# Patient Record
Sex: MALE | Race: WHITE | NOT HISPANIC OR LATINO | Employment: OTHER | URBAN - METROPOLITAN AREA
[De-identification: names, ages, dates, MRNs, and addresses within clinical notes are randomized per-mention and may not be internally consistent; named-entity substitution may affect disease eponyms.]

---

## 2017-04-13 ENCOUNTER — ALLSCRIPTS OFFICE VISIT (OUTPATIENT)
Dept: OTHER | Facility: OTHER | Age: 77
End: 2017-04-13

## 2017-06-26 ENCOUNTER — ALLSCRIPTS OFFICE VISIT (OUTPATIENT)
Dept: OTHER | Facility: OTHER | Age: 77
End: 2017-06-26

## 2017-06-26 ENCOUNTER — TRANSCRIBE ORDERS (OUTPATIENT)
Dept: ADMINISTRATIVE | Facility: HOSPITAL | Age: 77
End: 2017-06-26

## 2017-06-26 DIAGNOSIS — G56.03 CARPAL TUNNEL SYNDROME, BILATERAL: Primary | ICD-10-CM

## 2017-07-05 ENCOUNTER — OFFICE VISIT (OUTPATIENT)
Dept: RADIOLOGY | Facility: CLINIC | Age: 77
End: 2017-07-05
Payer: COMMERCIAL

## 2017-07-05 DIAGNOSIS — G56.03 CARPAL TUNNEL SYNDROME, BILATERAL: ICD-10-CM

## 2017-07-05 PROCEDURE — 95910 NRV CNDJ TEST 7-8 STUDIES: CPT

## 2017-07-05 PROCEDURE — 95886 MUSC TEST DONE W/N TEST COMP: CPT

## 2017-07-17 ENCOUNTER — ALLSCRIPTS OFFICE VISIT (OUTPATIENT)
Dept: OTHER | Facility: OTHER | Age: 77
End: 2017-07-17

## 2017-07-17 ENCOUNTER — APPOINTMENT (OUTPATIENT)
Dept: LAB | Facility: CLINIC | Age: 77
End: 2017-07-17
Payer: COMMERCIAL

## 2017-07-17 DIAGNOSIS — G56.03 CARPAL TUNNEL SYNDROME, BILATERAL: ICD-10-CM

## 2017-07-17 LAB
ANION GAP SERPL CALCULATED.3IONS-SCNC: 6 MMOL/L (ref 4–13)
BASOPHILS # BLD AUTO: 0.01 THOUSANDS/ΜL (ref 0–0.1)
BASOPHILS NFR BLD AUTO: 0 % (ref 0–1)
BUN SERPL-MCNC: 22 MG/DL (ref 5–25)
CALCIUM SERPL-MCNC: 9.3 MG/DL (ref 8.3–10.1)
CHLORIDE SERPL-SCNC: 105 MMOL/L (ref 100–108)
CO2 SERPL-SCNC: 30 MMOL/L (ref 21–32)
CREAT SERPL-MCNC: 1.15 MG/DL (ref 0.6–1.3)
EOSINOPHIL # BLD AUTO: 0.18 THOUSAND/ΜL (ref 0–0.61)
EOSINOPHIL NFR BLD AUTO: 3 % (ref 0–6)
ERYTHROCYTE [DISTWIDTH] IN BLOOD BY AUTOMATED COUNT: 14.4 % (ref 11.6–15.1)
GFR SERPL CREATININE-BSD FRML MDRD: >60 ML/MIN/1.73SQ M
GLUCOSE SERPL-MCNC: 93 MG/DL (ref 65–140)
HCT VFR BLD AUTO: 49.2 % (ref 36.5–49.3)
HGB BLD-MCNC: 16.3 G/DL (ref 12–17)
LYMPHOCYTES # BLD AUTO: 1.57 THOUSANDS/ΜL (ref 0.6–4.47)
LYMPHOCYTES NFR BLD AUTO: 23 % (ref 14–44)
MCH RBC QN AUTO: 30 PG (ref 26.8–34.3)
MCHC RBC AUTO-ENTMCNC: 33.1 G/DL (ref 31.4–37.4)
MCV RBC AUTO: 90 FL (ref 82–98)
MONOCYTES # BLD AUTO: 0.43 THOUSAND/ΜL (ref 0.17–1.22)
MONOCYTES NFR BLD AUTO: 6 % (ref 4–12)
NEUTROPHILS # BLD AUTO: 4.64 THOUSANDS/ΜL (ref 1.85–7.62)
NEUTS SEG NFR BLD AUTO: 68 % (ref 43–75)
PLATELET # BLD AUTO: 161 THOUSANDS/UL (ref 149–390)
PMV BLD AUTO: 10.3 FL (ref 8.9–12.7)
POTASSIUM SERPL-SCNC: 5 MMOL/L (ref 3.5–5.3)
RBC # BLD AUTO: 5.44 MILLION/UL (ref 3.88–5.62)
SODIUM SERPL-SCNC: 141 MMOL/L (ref 136–145)
WBC # BLD AUTO: 6.83 THOUSAND/UL (ref 4.31–10.16)

## 2017-07-17 PROCEDURE — 36415 COLL VENOUS BLD VENIPUNCTURE: CPT

## 2017-07-17 PROCEDURE — 85025 COMPLETE CBC W/AUTO DIFF WBC: CPT

## 2017-07-17 PROCEDURE — 80048 BASIC METABOLIC PNL TOTAL CA: CPT

## 2017-07-26 ENCOUNTER — ALLSCRIPTS OFFICE VISIT (OUTPATIENT)
Dept: OTHER | Facility: OTHER | Age: 77
End: 2017-07-26

## 2017-07-31 ENCOUNTER — HOSPITAL ENCOUNTER (EMERGENCY)
Facility: HOSPITAL | Age: 77
Discharge: HOME | End: 2017-07-31
Attending: EMERGENCY MEDICINE | Admitting: EMERGENCY MEDICINE
Payer: COMMERCIAL

## 2017-07-31 VITALS
BODY MASS INDEX: 31.08 KG/M2 | OXYGEN SATURATION: 96 % | TEMPERATURE: 98.5 F | HEART RATE: 99 BPM | WEIGHT: 198 LBS | SYSTOLIC BLOOD PRESSURE: 131 MMHG | DIASTOLIC BLOOD PRESSURE: 94 MMHG | HEIGHT: 67 IN | RESPIRATION RATE: 16 BRPM

## 2017-07-31 DIAGNOSIS — H60.509 OTITIS EXTERNA; ACUTE: Primary | ICD-10-CM

## 2017-07-31 DIAGNOSIS — H66.90 OTITIS MEDIA, ACUTE: ICD-10-CM

## 2017-07-31 PROCEDURE — 99282 EMERGENCY DEPT VISIT SF MDM: CPT

## 2017-07-31 RX ORDER — AMOXICILLIN 875 MG/1
875 TABLET, COATED ORAL 2 TIMES DAILY
Qty: 20 TABLET | Refills: 0 | Status: ON HOLD | OUTPATIENT
Start: 2017-07-31 | End: 2017-08-08 | Stop reason: ALTCHOICE

## 2017-07-31 RX ORDER — NEOMYCIN SULFATE, POLYMYXIN B SULFATE, HYDROCORTISONE 3.5; 10000; 1 MG/ML; [USP'U]/ML; MG/ML
4 SOLUTION/ DROPS AURICULAR (OTIC) EVERY 6 HOURS SCHEDULED
Qty: 5 ML | Refills: 0 | Status: ON HOLD | OUTPATIENT
Start: 2017-07-31 | End: 2017-08-08 | Stop reason: ALTCHOICE

## 2017-08-08 ENCOUNTER — HOSPITAL ENCOUNTER (OUTPATIENT)
Facility: HOSPITAL | Age: 77
Setting detail: OUTPATIENT SURGERY
Discharge: HOME | End: 2017-08-08
Attending: ORTHOPAEDIC SURGERY | Admitting: ORTHOPAEDIC SURGERY
Payer: COMMERCIAL

## 2017-08-08 ENCOUNTER — ANESTHESIA EVENT (OUTPATIENT)
Dept: PERIOP | Facility: HOSPITAL | Age: 77
End: 2017-08-08
Payer: COMMERCIAL

## 2017-08-08 ENCOUNTER — ANESTHESIA (OUTPATIENT)
Dept: PERIOP | Facility: HOSPITAL | Age: 77
End: 2017-08-08
Payer: COMMERCIAL

## 2017-08-08 ENCOUNTER — APPOINTMENT (EMERGENCY)
Dept: RADIOLOGY | Facility: HOSPITAL | Age: 77
End: 2017-08-08
Payer: COMMERCIAL

## 2017-08-08 ENCOUNTER — HOSPITAL ENCOUNTER (OUTPATIENT)
Facility: HOSPITAL | Age: 77
Setting detail: OBSERVATION
Discharge: HOME | End: 2017-08-09
Attending: EMERGENCY MEDICINE | Admitting: INTERNAL MEDICINE
Payer: COMMERCIAL

## 2017-08-08 ENCOUNTER — GENERIC CONVERSION - ENCOUNTER (OUTPATIENT)
Dept: OTHER | Facility: OTHER | Age: 77
End: 2017-08-08

## 2017-08-08 VITALS
SYSTOLIC BLOOD PRESSURE: 134 MMHG | DIASTOLIC BLOOD PRESSURE: 59 MMHG | HEART RATE: 92 BPM | WEIGHT: 198 LBS | HEIGHT: 67 IN | BODY MASS INDEX: 31.08 KG/M2 | OXYGEN SATURATION: 96 % | RESPIRATION RATE: 18 BRPM | TEMPERATURE: 97.3 F

## 2017-08-08 DIAGNOSIS — I48.91 ATRIAL FIBRILLATION (HCC): Primary | ICD-10-CM

## 2017-08-08 PROBLEM — G56.01 CARPAL TUNNEL SYNDROME OF RIGHT WRIST: Status: ACTIVE | Noted: 2017-08-08

## 2017-08-08 LAB
ANION GAP SERPL CALCULATED.3IONS-SCNC: 10 MMOL/L (ref 4–13)
APTT PPP: 32 SECONDS (ref 23–35)
BASOPHILS # BLD AUTO: 0.02 THOUSANDS/ΜL (ref 0–0.1)
BASOPHILS NFR BLD AUTO: 0 % (ref 0–1)
BUN SERPL-MCNC: 14 MG/DL (ref 5–25)
CALCIUM SERPL-MCNC: 9.1 MG/DL (ref 8.3–10.1)
CHLORIDE SERPL-SCNC: 105 MMOL/L (ref 100–108)
CO2 SERPL-SCNC: 28 MMOL/L (ref 21–32)
CREAT SERPL-MCNC: 0.91 MG/DL (ref 0.6–1.3)
EOSINOPHIL # BLD AUTO: 0.14 THOUSAND/ΜL (ref 0–0.61)
EOSINOPHIL NFR BLD AUTO: 2 % (ref 0–6)
ERYTHROCYTE [DISTWIDTH] IN BLOOD BY AUTOMATED COUNT: 14 % (ref 11.6–15.1)
GFR SERPL CREATININE-BSD FRML MDRD: 83 ML/MIN/1.73SQ M
GLUCOSE SERPL-MCNC: 92 MG/DL (ref 65–140)
HCT VFR BLD AUTO: 47.2 % (ref 36.5–49.3)
HGB BLD-MCNC: 15.9 G/DL (ref 12–17)
INR PPP: 0.93 (ref 0.86–1.16)
LYMPHOCYTES # BLD AUTO: 1.38 THOUSANDS/ΜL (ref 0.6–4.47)
LYMPHOCYTES NFR BLD AUTO: 23 % (ref 14–44)
MCH RBC QN AUTO: 30.3 PG (ref 26.8–34.3)
MCHC RBC AUTO-ENTMCNC: 33.7 G/DL (ref 31.4–37.4)
MCV RBC AUTO: 90 FL (ref 82–98)
MONOCYTES # BLD AUTO: 0.39 THOUSAND/ΜL (ref 0.17–1.22)
MONOCYTES NFR BLD AUTO: 6 % (ref 4–12)
NEUTROPHILS # BLD AUTO: 4.19 THOUSANDS/ΜL (ref 1.85–7.62)
NEUTS SEG NFR BLD AUTO: 69 % (ref 43–75)
PLATELET # BLD AUTO: 163 THOUSANDS/UL (ref 149–390)
PMV BLD AUTO: 10.3 FL (ref 8.9–12.7)
POTASSIUM SERPL-SCNC: 4.2 MMOL/L (ref 3.5–5.3)
PROTHROMBIN TIME: 12.7 SECONDS (ref 12.1–14.4)
RBC # BLD AUTO: 5.25 MILLION/UL (ref 3.88–5.62)
SODIUM SERPL-SCNC: 143 MMOL/L (ref 136–145)
TROPONIN I SERPL-MCNC: <0.02 NG/ML
WBC # BLD AUTO: 6.12 THOUSAND/UL (ref 4.31–10.16)

## 2017-08-08 PROCEDURE — 99285 EMERGENCY DEPT VISIT HI MDM: CPT

## 2017-08-08 PROCEDURE — 85730 THROMBOPLASTIN TIME PARTIAL: CPT | Performed by: EMERGENCY MEDICINE

## 2017-08-08 PROCEDURE — 85025 COMPLETE CBC W/AUTO DIFF WBC: CPT | Performed by: EMERGENCY MEDICINE

## 2017-08-08 PROCEDURE — 96365 THER/PROPH/DIAG IV INF INIT: CPT

## 2017-08-08 PROCEDURE — 93005 ELECTROCARDIOGRAM TRACING: CPT | Performed by: EMERGENCY MEDICINE

## 2017-08-08 PROCEDURE — 84484 ASSAY OF TROPONIN QUANT: CPT | Performed by: EMERGENCY MEDICINE

## 2017-08-08 PROCEDURE — 71020 HB CHEST X-RAY 2VW FRONTAL&LATL: CPT

## 2017-08-08 PROCEDURE — 80048 BASIC METABOLIC PNL TOTAL CA: CPT | Performed by: EMERGENCY MEDICINE

## 2017-08-08 PROCEDURE — 36415 COLL VENOUS BLD VENIPUNCTURE: CPT | Performed by: EMERGENCY MEDICINE

## 2017-08-08 PROCEDURE — 85610 PROTHROMBIN TIME: CPT | Performed by: EMERGENCY MEDICINE

## 2017-08-08 RX ORDER — OXYCODONE HYDROCHLORIDE 5 MG/1
10 TABLET ORAL EVERY 4 HOURS PRN
Status: DISCONTINUED | OUTPATIENT
Start: 2017-08-08 | End: 2017-08-08

## 2017-08-08 RX ORDER — MORPHINE SULFATE 2 MG/ML
2 INJECTION, SOLUTION INTRAMUSCULAR; INTRAVENOUS EVERY 4 HOURS PRN
Status: DISCONTINUED | OUTPATIENT
Start: 2017-08-08 | End: 2017-08-08

## 2017-08-08 RX ORDER — ACETAMINOPHEN 325 MG/1
650 TABLET ORAL EVERY 6 HOURS PRN
Status: DISCONTINUED | OUTPATIENT
Start: 2017-08-08 | End: 2017-08-08 | Stop reason: HOSPADM

## 2017-08-08 RX ORDER — ASPIRIN 325 MG
325 TABLET, DELAYED RELEASE (ENTERIC COATED) ORAL DAILY
Status: DISCONTINUED | OUTPATIENT
Start: 2017-08-08 | End: 2017-08-09

## 2017-08-08 RX ORDER — FENTANYL CITRATE 50 UG/ML
INJECTION, SOLUTION INTRAMUSCULAR; INTRAVENOUS AS NEEDED
Status: DISCONTINUED | OUTPATIENT
Start: 2017-08-08 | End: 2017-08-08 | Stop reason: SURG

## 2017-08-08 RX ORDER — ACETAMINOPHEN 325 MG/1
650 TABLET ORAL EVERY 6 HOURS PRN
Status: DISCONTINUED | OUTPATIENT
Start: 2017-08-08 | End: 2017-08-09 | Stop reason: HOSPADM

## 2017-08-08 RX ORDER — MAGNESIUM HYDROXIDE/ALUMINUM HYDROXICE/SIMETHICONE 120; 1200; 1200 MG/30ML; MG/30ML; MG/30ML
30 SUSPENSION ORAL EVERY 6 HOURS PRN
Status: DISCONTINUED | OUTPATIENT
Start: 2017-08-08 | End: 2017-08-09 | Stop reason: HOSPADM

## 2017-08-08 RX ORDER — OXYCODONE HYDROCHLORIDE 5 MG/1
5 TABLET ORAL EVERY 4 HOURS PRN
Qty: 30 TABLET | Refills: 0 | Status: ON HOLD | OUTPATIENT
Start: 2017-08-08 | End: 2017-08-08 | Stop reason: ALTCHOICE

## 2017-08-08 RX ORDER — OXYCODONE HYDROCHLORIDE 5 MG/1
5 TABLET ORAL EVERY 4 HOURS PRN
Status: DISCONTINUED | OUTPATIENT
Start: 2017-08-08 | End: 2017-08-08

## 2017-08-08 RX ORDER — ONDANSETRON 2 MG/ML
4 INJECTION INTRAMUSCULAR; INTRAVENOUS EVERY 6 HOURS PRN
Status: DISCONTINUED | OUTPATIENT
Start: 2017-08-08 | End: 2017-08-09 | Stop reason: HOSPADM

## 2017-08-08 RX ORDER — DILTIAZEM HYDROCHLORIDE 5 MG/ML
15 INJECTION INTRAVENOUS EVERY 4 HOURS PRN
Status: DISCONTINUED | OUTPATIENT
Start: 2017-08-08 | End: 2017-08-09 | Stop reason: HOSPADM

## 2017-08-08 RX ORDER — DOCUSATE SODIUM 100 MG/1
100 CAPSULE, LIQUID FILLED ORAL 2 TIMES DAILY
Status: DISCONTINUED | OUTPATIENT
Start: 2017-08-08 | End: 2017-08-09 | Stop reason: HOSPADM

## 2017-08-08 RX ORDER — ONDANSETRON 2 MG/ML
4 INJECTION INTRAMUSCULAR; INTRAVENOUS EVERY 6 HOURS PRN
Status: DISCONTINUED | OUTPATIENT
Start: 2017-08-08 | End: 2017-08-08 | Stop reason: HOSPADM

## 2017-08-08 RX ADMIN — ASPIRIN 325 MG: 325 TABLET, DELAYED RELEASE ORAL at 16:54

## 2017-08-08 RX ADMIN — METOPROLOL TARTRATE 25 MG: 25 TABLET ORAL at 22:03

## 2017-08-08 RX ADMIN — DILTIAZEM HYDROCHLORIDE 10 MG/HR: 5 INJECTION INTRAVENOUS at 13:49

## 2017-08-08 RX ADMIN — FENTANYL CITRATE 25 MCG: 50 INJECTION INTRAMUSCULAR; INTRAVENOUS at 12:32

## 2017-08-08 NOTE — ED NOTES
Checked in on patient - resting comfortably - heart rate between 70 and 89   Spoke with Dr Sourav Bill in regards to something to eat (Pt has been NPO since last evening b/c of surgery) Dr Sourav Bill verbalized "Pt may have a regular diet"     Syl Pena RN  08/08/17 2180 Located within [facility]       Syl Pena RN  08/08/17 3965

## 2017-08-08 NOTE — H&P
History and Physical - [facility]    Patient Information: Mary Jo Callejas 78 y o  male MRN: [id number]  Unit/Bed#: -01 Encounter: [id number]  Admitting Physician: Jason Bal MD  PCP: Kay Mederos MD  Date of Admission:  08/08/17    Assessment/Plan:    Hospital Problem List:     Principal Problem:    Atrial fibrillation  Active Problems:    Carpal tunnel syndrome of right wrist      Plan for the Primary Problem(s):  · New-onset atrial fibrillation rate controlled we will have him on metoprolol 25 b i d , aspirin 325 mg p o  daily, 2-D echo, TSH T4, outpatient sleep study recommended, consult cardiology    Plan for Additional Problems:   · Carpal tunnel syndrome outpatient follow-up with orthopedics    VTE Prophylaxis: Enoxaparin (Lovenox)  / sequential compression device   Code Status: Full code  POLST: There is no POLST form on file for this patient (pre-hospital)    Anticipated Length of Stay:  Patient will be admitted on an Observation basis with an anticipated length of stay of  Less than 2 midnights  Chief Complaint:       Referred from the OR for atrial fibrillation    History of Present Illness:    Mary Jo Callejas is a 78 y o  male electively admitted to the floor for carpal tunnel surgery, he was noted to atrial fibrillation hence was transferred to the ED for further evaluation  Patient denies history of chest pain shortness of breath palpitations presyncope or syncope  He reports fatigue but attributes it to exertion, he isn't not limited in his activities due to chest pain shortness of breath presyncope or syncope  He denies exertional symptoms of chest pain shortness of breath palpitations presyncope or syncope  No history of fever chills sweats or constitutional symptoms  No history of cough and chest tightness wheezing  No history of change in appetite, his weight is stable  No history of back pain nausea vomiting diarrhea      Review of Systems:    Review of Systems   All other systems reviewed and are negative  Past Medical and Surgical History:     Past Medical History:   Diagnosis Date    Diverticulitis        Past Surgical History:   Procedure Laterality Date    COLON SURGERY      HERNIA REPAIR      REPLACEMENT TOTAL KNEE         Meds/Allergies:    Prior to Admission medications    Medication Sig Start Date End Date Taking? Authorizing Provider   amoxicillin (AMOXIL) 875 mg tablet Take 1 tablet by mouth 2 (two) times a day for 10 days 7/31/17 8/8/17  Pamela Nolan MD   neomycin-polymyxin-hydrocortisone (CORTISPORIN) 1 % SOLN Administer 4 drops to the right ear every 6 (six) hours for 7 days 7/31/17 8/8/17  Pamela Nolan MD   oxyCODONE (ROXICODONE) 5 mg immediate release tablet Take 1 tablet by mouth every 4 (four) hours as needed for moderate pain for up to 10 days Max Daily Amount: 30 mg 8/8/17 8/8/17  Olimpia Ortega,      I have reviewed home medications with patient personally  Allergies: No Known Allergies    Social History:     Marital Status: /Civil Union   Occupation:   Patient Pre-hospital Living Situation: home  Patient Pre-hospital Level of Mobility: Independent  Patient Pre-hospital Diet Restrictions: no  Substance Use History:   History   Alcohol Use    1 2 oz/week    2 Cans of beer per week     History   Smoking Status    Never Smoker   Smokeless Tobacco    Not on file     History   Drug Use No       Family History:    non-contributory    Physical Exam:     Vitals:   Blood Pressure: 119/95 (08/08/17 1550)  Pulse: 83 (08/08/17 1550)  Temperature: 97 7 °F (36 5 °C) (08/08/17 1550)  Temp Source: Oral (08/08/17 1550)  Respirations: 18 (08/08/17 1550)  Height: 5' 7" (170 2 cm) (08/08/17 1550)  Weight - Scale: 87 5 kg (192 lb 14 4 oz) (08/08/17 1550)  SpO2: 97 % (08/08/17 1550)    Physical Exam   Constitutional: He is oriented to person, place, and time  He appears well-developed and well-nourished  No distress     HENT: Head: Normocephalic  Mouth/Throat: No oropharyngeal exudate  Eyes: Pupils are equal, round, and reactive to light  Right eye exhibits no discharge  Left eye exhibits no discharge  No scleral icterus  Neck: Normal range of motion  No JVD present  No tracheal deviation present  No thyromegaly present  Cardiovascular:   Heart sounds irregular, no murmurs appreciable   Pulmonary/Chest: Effort normal and breath sounds normal  He has no wheezes  He has no rales  He exhibits no tenderness  Abdominal: Soft  He exhibits no distension and no mass  There is no tenderness  There is no rebound and no guarding  Musculoskeletal: Normal range of motion  He exhibits no edema  Lymphadenopathy:     He has no cervical adenopathy  Neurological: He is alert and oriented to person, place, and time  Skin: Skin is warm  No rash noted  He is not diaphoretic  Vitals reviewed  Additional Data:     Lab Results: I have personally reviewed pertinent reports  Results from last 7 days  Lab Units 08/08/17  1335   WBC Thousand/uL 6 12   HEMOGLOBIN g/dL 15 9   HEMATOCRIT % 47 2   PLATELETS Thousands/uL 163   NEUTROS PCT % 69   LYMPHS PCT % 23   MONOS PCT % 6   EOS PCT % 2       Results from last 7 days  Lab Units 08/08/17  1335   SODIUM mmol/L 143   POTASSIUM mmol/L 4 2   CHLORIDE mmol/L 105   CO2 mmol/L 28   BUN mg/dL 14   CREATININE mg/dL 0 91   CALCIUM mg/dL 9 1   GLUCOSE RANDOM mg/dL 92       Results from last 7 days  Lab Units 08/08/17  1335   INR  0 93       Imaging: I have personally reviewed pertinent reports  Xr Chest 2 Views    Result Date: 8/8/2017  Narrative: CHEST INDICATION:  Atrial fibrillation  COMPARISON:  None VIEWS:  Frontal and lateral projections IMAGES:  3 FINDINGS:     Cardiomediastinal silhouette appears unremarkable  The lungs are clear  No pneumothorax or pleural effusion  Visualized osseous structures appear within normal limits for the patient's age       Impression: No acute abnormality in the chest  Workstation performed: [id number]       EKG, Pathology, and Other Studies Reviewed on Admission:   · EKG: Pending    Allscripts / Epic Records Reviewed: Yes     ** Please Note: This note has been constructed using a voice recognition system   **

## 2017-08-08 NOTE — PROGRESS NOTES
pts or case was canceled due to new onset of afib, dr Allyson Goins said he should go to er, will transport over

## 2017-08-08 NOTE — ED PROVIDER NOTES
History  Chief Complaint   Patient presents with    Atrial Fibrillation     Pt transfered from same day surgery - per transfering RN - "pt received 25mg of IV Fentanyl, Pt placed on cardiac monitor and was in A Fib, heart rate 's " Pt denies any hx, was in same day surgery for carp tunnel     Patient referred to the emergency department from the operating room where he was to undergo a carpal tunnel release procedure  When placed on monitor after being given 25 mg of fentanyl and was found to be in atrial fibrillation  Patient denies any symptoms such as chest pain palpitations dizziness weakness shortness of breath  Does not of a history of irregular heartbeat  He denies leg or calf pain  Patient denies chest pain fever chills cough  Prior to Admission Medications   Prescriptions Last Dose Informant Patient Reported? Taking?   neomycin-polymyxin-hydrocortisone (CORTISPORIN) 1 % SOLN   No No   Sig: Administer 4 drops to the right ear every 6 (six) hours for 7 days   oxyCODONE (ROXICODONE) 5 mg immediate release tablet   No No   Sig: Take 1 tablet by mouth every 4 (four) hours as needed for moderate pain for up to 10 days Max Daily Amount: 30 mg      Facility-Administered Medications: None       Past Medical History:   Diagnosis Date    Diverticulitis        Past Surgical History:   Procedure Laterality Date    COLON SURGERY      HAND SURGERY Right     HERNIA REPAIR      REPLACEMENT TOTAL KNEE         History reviewed  No pertinent family history  I have reviewed and agree with the history as documented  Social History   Substance Use Topics    Smoking status: Never Smoker    Smokeless tobacco: Not on file    Alcohol use 1 2 oz/week     2 Cans of beer per week        Review of Systems   Constitutional: Negative  Negative for diaphoresis, fatigue and fever  HENT: Negative  Negative for congestion, rhinorrhea, trouble swallowing and voice change  Eyes: Negative      Respiratory: Negative  Negative for cough, chest tightness, shortness of breath, wheezing and stridor  Cardiovascular: Negative  Negative for chest pain, palpitations and leg swelling  Gastrointestinal: Negative  Negative for abdominal pain, blood in stool, diarrhea, nausea, rectal pain and vomiting  Endocrine: Negative  Genitourinary: Negative  Musculoskeletal: Negative  Negative for back pain, myalgias, neck pain and neck stiffness  Skin: Negative  Negative for rash and wound  Allergic/Immunologic: Negative  Neurological: Negative  Negative for dizziness, tremors, seizures, syncope, facial asymmetry, speech difficulty, weakness, light-headedness, numbness and headaches  Hematological: Negative  Does not bruise/bleed easily  Psychiatric/Behavioral: Negative  Physical Exam  ED Triage Vitals [08/08/17 1316]   Temp Pulse Respirations Blood Pressure SpO2   -- (!) 106 20 (!) 134/101 97 %      Temp src Heart Rate Source Patient Position BP Location FiO2 (%)   -- Monitor Lying Right arm --      Pain Score       --           Physical Exam   Constitutional: He is oriented to person, place, and time  He appears well-developed and well-nourished  Comfortable appearance without respiratory distress  Patient looks comfortable sitting upright in the stretcher  HENT:   Head: Normocephalic and atraumatic  Right Ear: External ear normal    Left Ear: External ear normal    Mouth/Throat: Oropharynx is clear and moist    Eyes: Conjunctivae and EOM are normal  Pupils are equal, round, and reactive to light  Neck: Normal range of motion  Neck supple  Cardiovascular: Normal heart sounds and intact distal pulses  Irregular rhythm with intermittent tachycardia   Pulmonary/Chest: Effort normal and breath sounds normal  No respiratory distress  He has no wheezes  He has no rales  He exhibits no tenderness  Abdominal: Soft  Bowel sounds are normal  He exhibits no distension and no mass   There is no tenderness  There is no rebound and no guarding  No hernia  Musculoskeletal: Normal range of motion  He exhibits no edema, tenderness or deformity  No bilateral calf tenderness  No edema or swelling  Neurological: He is alert and oriented to person, place, and time  He has normal reflexes  He displays normal reflexes  No cranial nerve deficit  He exhibits normal muscle tone  Coordination normal    Skin: Skin is warm and dry  No rash noted  No erythema  No pallor  Psychiatric: He has a normal mood and affect  His behavior is normal  Judgment and thought content normal    Nursing note and vitals reviewed  ED Medications  Medications   diltiazem (CARDIZEM) 125 mg in sodium chloride 0 9 % 125 mL infusion (12 5 mg/hr Intravenous Rate/Dose Change 8/8/17 1406)       Diagnostic Studies  Labs Reviewed   CBC AND DIFFERENTIAL - Normal       Result Value Ref Range Status    WBC 6 12  4 31 - 10 16 Thousand/uL Final    RBC 5 25  3 88 - 5 62 Million/uL Final    Hemoglobin 15 9  12 0 - 17 0 g/dL Final    Hematocrit 47 2  36 5 - 49 3 % Final    MCV 90  82 - 98 fL Final    MCH 30 3  26 8 - 34 3 pg Final    MCHC 33 7  31 4 - 37 4 g/dL Final    RDW 14 0  11 6 - 15 1 % Final    MPV 10 3  8 9 - 12 7 fL Final    Platelets 177  769 - 390 Thousands/uL Final    Neutrophils Relative 69  43 - 75 % Final    Lymphocytes Relative 23  14 - 44 % Final    Monocytes Relative 6  4 - 12 % Final    Eosinophils Relative 2  0 - 6 % Final    Basophils Relative 0  0 - 1 % Final    Neutrophils Absolute 4 19  1 85 - 7 62 Thousands/µL Final    Lymphocytes Absolute 1 38  0 60 - 4 47 Thousands/µL Final    Monocytes Absolute 0 39  0 17 - 1 22 Thousand/µL Final    Eosinophils Absolute 0 14  0 00 - 0 61 Thousand/µL Final    Basophils Absolute 0 02  0 00 - 0 10 Thousands/µL Final   PROTIME-INR - Normal    Protime 12 7  12 1 - 14 4 seconds Final    INR 0 93  0 86 - 1 16 Final   APTT - Normal    PTT 32  23 - 35 seconds Final    Narrative:      Therapeutic Heparin Range = 60-90 seconds   BASIC METABOLIC PANEL    Sodium 903  136 - 145 mmol/L Final    Potassium 4 2  3 5 - 5 3 mmol/L Final    Chloride 105  100 - 108 mmol/L Final    CO2 28  21 - 32 mmol/L Final    Anion Gap 10  4 - 13 mmol/L Final    BUN 14  5 - 25 mg/dL Final    Creatinine 0 91  0 60 - 1 30 mg/dL Final    Comment: Standardized to IDMS reference method    Glucose 92  65 - 140 mg/dL Final    Comment:   If the patient is fasting, the ADA then defines impaired fasting glucose as > 100 mg/dL and diabetes as > or equal to 123 mg/dL  Specimen collection should occur prior to Sulfasalazine administration due to the potential for falsely depressed results  Specimen collection should occur prior to Sulfapyridine administration due to the potential for falsely elevated results  Calcium 9 1  8 3 - 10 1 mg/dL Final    eGFR 83  ml/min/1 73sq m Final    Narrative:     National Kidney Disease Education Program recommendations are as follows:  GFR calculation is accurate only with a steady state creatinine  Chronic Kidney disease less than 60 ml/min/1 73 sq  meters  Kidney failure less than 15 ml/min/1 73 sq  meters     TROPONIN I       XR chest 2 views   ED Interpretation   NAD          Procedures  ECG 12 Lead Documentation  Date/Time: 8/8/2017 1:26 PM  Performed by: Ivan Martinez by: Caridad Pendleton     ECG reviewed by me, the ED Provider: yes    Patient location:  ED  Previous ECG:     Previous ECG:  Compared to current    Similarity:  Changes noted  Interpretation:     Interpretation: abnormal    Rate:     ECG rate:  108    ECG rate assessment: tachycardic    Rhythm:     Rhythm: atrial fibrillation    QRS:     QRS axis:  Normal    QRS intervals:  Normal  Conduction:     Conduction: normal    ST segments:     ST segments:  Non-specific  T waves:     T waves: non-specific            Phone Contacts  ED Phone Contact    ED Course  ED Course   Miguel A Bryan's Documentation   Comment Time   Case discussed with Dr Moody hospitalist service who will admit this patient  They will evaluate to see whether the patient needs to be heparinized as the wrist some uncertainty as to the duration of his new onset atrial fibrillation  The patient is hemodynamically stable without complaints at this time  Cardiology will be consulted  08/08 1430                               MDM  The patient presented with a condition in which there was a high probability of imminent or life-threatening deterioration, and critical care services (excluding separately billable procedures) totalled 30-74 minutes  Disposition  Final diagnoses:   Atrial fibrillation     ED Disposition     ED Disposition Condition Comment    Admit  Case was discussed with Dr Acosta Bills and the patient's admission status was agreed to be Admission Status: observation status to the service of Dr Adrien Guzmán    None       Patient's Medications   Discharge Prescriptions    No medications on file     No discharge procedures on file      ED Provider  Electronically Signed by       Shereen Sue MD  08/08/17 1601 Francheska Gross MD  08/08/17 9268

## 2017-08-08 NOTE — DISCHARGE INSTRUCTIONS
Discharge Instructions:    Weight bearing:  Do not put direct pressure over the incision  Do not lift with this hand until postoperative appointment  Dressings:  Keep the dressings clean, dry, and intact for 3 days  May remove dressings after 3 days and shower  Please clean the incision with alcohol after showers until postoperative appointment  May apply band-aids as needed  DO NOT SOAK THE INCISION                              Pain:  You have been prescribed a narcotic  Please take this sparingly and only AS NEEDED for pain  Appt Instructions: If you do not have your appointment, please call the clinic at [phone] to f/u with Dr Mercy Alvarado in 2 weeks from date of surgery  Discharge Instructions:    Weight bearing:  Do not put direct pressure over the incision  Do not lift with this hand until postoperative appointment  Dressings:  Keep the dressings clean, dry, and intact for 3 days  May remove dressings after 3 days and shower  Please clean the incision with alcohol after showers until postoperative appointment  May apply band-aids as needed  DO NOT SOAK THE INCISION                              Pain:  You have been prescribed a narcotic  Please take this sparingly and only AS NEEDED for pain  Appt Instructions: If you do not have your appointment, please call the clinic at [phone] to f/u with Dr Mercy Alvarado in 2 weeks from date of surgery

## 2017-08-08 NOTE — ED NOTES
Spoke with Dr Knutson Waveland - based on Pts heart rate () keeping cardizem at current dose of 12 5mg/hr  Will continue to monitor       April Davina Lama RN  08/08/17 5113

## 2017-08-08 NOTE — OP NOTE
OPERATIVE REPORT  PATIENT NAME: Marlyn Rebollar    :  1940  MRN: [id number]  Pt Location: AN OR ROOM 02    SURGERY DATE: 2017    Surgeon(s) and Role:   Patient was brought back to the operative suite where was found that he was in atrial fibrillation this is new onset  Surgical case was canceled  He'll be sent to the ER for evaluation      SIGNATURE: Alisia Ivan DO  DATE: 2017  TIME: 12:45 PM

## 2017-08-09 ENCOUNTER — GENERIC CONVERSION - ENCOUNTER (OUTPATIENT)
Dept: OTHER | Facility: OTHER | Age: 77
End: 2017-08-09

## 2017-08-09 ENCOUNTER — APPOINTMENT (OUTPATIENT)
Dept: NON INVASIVE DIAGNOSTICS | Facility: HOSPITAL | Age: 77
End: 2017-08-09
Payer: COMMERCIAL

## 2017-08-09 VITALS
HEART RATE: 86 BPM | DIASTOLIC BLOOD PRESSURE: 65 MMHG | WEIGHT: 192.9 LBS | RESPIRATION RATE: 18 BRPM | OXYGEN SATURATION: 93 % | SYSTOLIC BLOOD PRESSURE: 123 MMHG | TEMPERATURE: 98 F | BODY MASS INDEX: 30.28 KG/M2 | HEIGHT: 67 IN

## 2017-08-09 LAB
ANION GAP SERPL CALCULATED.3IONS-SCNC: 6 MMOL/L (ref 4–13)
ATRIAL RATE: 192 BPM
BUN SERPL-MCNC: 16 MG/DL (ref 5–25)
CALCIUM SERPL-MCNC: 9 MG/DL (ref 8.3–10.1)
CHLORIDE SERPL-SCNC: 107 MMOL/L (ref 100–108)
CHOLEST SERPL-MCNC: 138 MG/DL (ref 50–200)
CO2 SERPL-SCNC: 29 MMOL/L (ref 21–32)
CREAT SERPL-MCNC: 0.88 MG/DL (ref 0.6–1.3)
EST. AVERAGE GLUCOSE BLD GHB EST-MCNC: 117 MG/DL
GFR SERPL CREATININE-BSD FRML MDRD: 85 ML/MIN/1.73SQ M
GLUCOSE SERPL-MCNC: 94 MG/DL (ref 65–140)
HBA1C MFR BLD: 5.7 % (ref 4.2–6.3)
HDLC SERPL-MCNC: 43 MG/DL (ref 40–60)
LDLC SERPL CALC-MCNC: 74 MG/DL (ref 0–100)
MAGNESIUM SERPL-MCNC: 2.2 MG/DL (ref 1.6–2.6)
PLATELET # BLD AUTO: 175 THOUSANDS/UL (ref 149–390)
PMV BLD AUTO: 10.6 FL (ref 8.9–12.7)
POTASSIUM SERPL-SCNC: 4.6 MMOL/L (ref 3.5–5.3)
QRS AXIS: 30 DEGREES
QRSD INTERVAL: 82 MS
QT INTERVAL: 296 MS
QTC INTERVAL: 396 MS
SODIUM SERPL-SCNC: 142 MMOL/L (ref 136–145)
T WAVE AXIS: -49 DEGREES
T4 FREE SERPL-MCNC: 1.04 NG/DL (ref 0.76–1.46)
TRIGL SERPL-MCNC: 107 MG/DL
TSH SERPL DL<=0.05 MIU/L-ACNC: 1.19 UIU/ML (ref 0.36–3.74)
VENTRICULAR RATE: 108 BPM

## 2017-08-09 PROCEDURE — 80048 BASIC METABOLIC PNL TOTAL CA: CPT | Performed by: INTERNAL MEDICINE

## 2017-08-09 PROCEDURE — 84443 ASSAY THYROID STIM HORMONE: CPT | Performed by: INTERNAL MEDICINE

## 2017-08-09 PROCEDURE — 93306 TTE W/DOPPLER COMPLETE: CPT

## 2017-08-09 PROCEDURE — 85049 AUTOMATED PLATELET COUNT: CPT | Performed by: INTERNAL MEDICINE

## 2017-08-09 PROCEDURE — 83735 ASSAY OF MAGNESIUM: CPT | Performed by: INTERNAL MEDICINE

## 2017-08-09 PROCEDURE — 83036 HEMOGLOBIN GLYCOSYLATED A1C: CPT | Performed by: INTERNAL MEDICINE

## 2017-08-09 PROCEDURE — 84439 ASSAY OF FREE THYROXINE: CPT | Performed by: INTERNAL MEDICINE

## 2017-08-09 PROCEDURE — 80061 LIPID PANEL: CPT | Performed by: INTERNAL MEDICINE

## 2017-08-09 RX ADMIN — ENOXAPARIN SODIUM 40 MG: 40 INJECTION SUBCUTANEOUS at 08:21

## 2017-08-09 RX ADMIN — ASPIRIN 325 MG: 325 TABLET, DELAYED RELEASE ORAL at 08:20

## 2017-08-09 RX ADMIN — METOPROLOL TARTRATE 25 MG: 25 TABLET ORAL at 08:20

## 2017-08-09 NOTE — SOCIAL WORK
[facility] contacted CM who stated pt's co pay amount would be $47 per month  They are filling his prescription and will be delivering it to his room as soon as possible  Cm met with pt and wife to inform him of his co pay amount  He is fine with paying this amount and does not feel he will not be able to afford it  [facility]  Nursing has been made aware pt is ready to leave after he receives his medication  No further Cm interventions needed at this time

## 2017-08-09 NOTE — SOCIAL WORK
OBS   Pt is in need of Eliquis which is new fo him  Script was sent to [facility] with the 30 Day Free Trial card so that the script can be filled and delivered to pt's room  Cm contacted the pharmacy in order to request co pay amount so Cm can discuss with pt and Cardiology  Cardiology feels as though even if co pay is too high for the pt, they will most likely be able to provide samples from the office or address alternative meds if needed  Cm awaiting call from pharmacy and will f/u with pt

## 2017-08-09 NOTE — PROGRESS NOTES
Patient came up to the floor on a Cardizem drip  The drip was discontinued    Patient's HR is stable currently in Afib at 77-85 bpm

## 2017-08-09 NOTE — CASE MANAGEMENT
Initial Clinical Review    Admission: Date/Time/Statement: 08/08/17@ 1436 Observation Written     Orders Placed This Encounter   Procedures    Place in Observation (expected length of stay for this patient is less than two midnights)     Standing Status:   Standing     Number of Occurrences:   1     Order Specific Question:   Admitting Physician     Answer:   Jw Uriarte     Order Specific Question:   Level of Care     Answer:   Med Surg [16]         ED: Date/Time/Mode of Arrival:   ED Arrival Information     Expected Arrival Acuity Means of Arrival Escorted By Service Admission Type    - 8/8/2017 13:00 Urgent Walk-In Self General Medicine Urgent    Arrival Complaint    -          Chief Complaint:   Chief Complaint   Patient presents with    Atrial Fibrillation     Pt transfered from same day surgery - per transfering RN - "pt received 25mg of IV Fentanyl, Pt placed on cardiac monitor and was in A Fib, heart rate 's " Pt denies any hx, was in same day surgery for carp tunnel       History of Illness: Princess Cedeño is a 78 y o  male electively admitted to the floor for carpal tunnel surgery, he was noted to atrial fibrillation hence was transferred to the ED for further evaluation  Patient denies history of chest pain shortness of breath palpitations presyncope or syncope  He reports fatigue but attributes it to exertion, he isn't not limited in his activities due to chest pain shortness of breath presyncope or syncope       ED Vital Signs:   ED Triage Vitals   Temperature Pulse Respirations Blood Pressure SpO2   08/08/17 1550 08/08/17 1316 08/08/17 1316 08/08/17 1316 08/08/17 1316   97 7 °F (36 5 °C) (!) 106 20 (!) 134/101 97 %      Temp Source Heart Rate Source Patient Position BP Location FiO2 (%)   08/08/17 1550 08/08/17 1316 08/08/17 1316 08/08/17 1316 --   Oral Monitor Lying Right arm       Pain Score       --               Wt Readings from Last 1 Encounters:   08/08/17 87 5 kg (192 lb 14 4 oz)       Abnormal Labs/Diagnostic Test Results:   CXR:  Impression: No acute abnormality in the chest  EKG:  Rate 108, AFib    ED Treatment:   Medication Administration from 08/08/2017 1300 to 08/08/2017 1539       Date/Time Order Dose Route Action     08/08/2017 1406 diltiazem (CARDIZEM) 125 mg in sodium chloride 0 9 % 125 mL infusion 12 5 mg/hr Intravenous Rate/Dose Change     08/08/2017 1349 diltiazem (CARDIZEM) 125 mg in sodium chloride 0 9 % 125 mL infusion 10 mg/hr Intravenous New Bag          Past Medical/Surgical History: Active Ambulatory Problems     Diagnosis Date Noted    Carpal tunnel syndrome of right wrist 08/08/2017     Resolved Ambulatory Problems     Diagnosis Date Noted    No Resolved Ambulatory Problems     Past Medical History:   Diagnosis Date    Diverticulitis        Admitting Diagnosis: Atrial fibrillation [I48 91]  Weakness [R53 1]    Age/Sex: 78 y o  male    Assessment:  Principal Problem:    Atrial fibrillation  Active Problems:    Carpal tunnel syndrome of right wrist        Plan for the Primary Problem(s):  · New-onset atrial fibrillation rate controlled we will have him on metoprolol 25 b i d , aspirin 325 mg p o  daily, 2-D echo, TSH T4, outpatient sleep study recommended, consult cardiology     Plan for Additional Problems:   · Carpal tunnel syndrome outpatient follow-up with orthopedics     VTE Prophylaxis: Enoxaparin (Lovenox)  / sequential compression device   Code Status: Full code  POLST: There is no POLST form on file for this patient (pre-hospital)     Anticipated Length of Stay:  Patient will be admitted on an Observation basis with an anticipated length of stay of  Less than 2 midnights        Admission Orders:  Telemetry  EKG  Echocardiogram  Consult cardiology  Sequential compression device    Scheduled Meds:   aspirin 325 mg Oral Daily   docusate sodium 100 mg Oral BID   enoxaparin 40 mg Subcutaneous Daily   metoprolol tartrate 25 mg Oral Q12H [facility]     Continuous Infusions:    PRN Meds:   acetaminophen    aluminum-magnesium hydroxide-simethicone    diltiazem    ondansetron

## 2017-08-09 NOTE — PLAN OF CARE
CARDIOVASCULAR - ADULT     Maintains optimal cardiac output and hemodynamic stability Progressing     Absence of cardiac dysrhythmias or at baseline rhythm Progressing        DISCHARGE PLANNING     Discharge to home or other facility with appropriate resources Progressing        INFECTION - ADULT     Absence or prevention of progression during hospitalization Progressing     Absence of fever/infection during neutropenic period Progressing        Knowledge Deficit     Patient/family/caregiver demonstrates understanding of disease process, treatment plan, medications, and discharge instructions Progressing        PAIN - ADULT     Verbalizes/displays adequate comfort level or baseline comfort level Progressing        SAFETY ADULT     Patient will remain free of falls Progressing     Maintain or return to baseline ADL function Progressing     Maintain or return mobility status to optimal level Progressing

## 2017-08-09 NOTE — CONSULTS
Consultation - Cardiology Team One  Antony Pratt 78 y o  male MRN: [id number]  Unit/Bed#: -01 Encounter: [id number]    Inpatient consult to Cardiology  Consult performed by: [address] [place], [place] [address] ordered by: Courtney Crane        Physician Requesting Consult: Hollie Duncan MD     Reason for Consult / Principal Problem: atrial fibrillation    History of Present Illness      HPI: Antony Pratt is a 78y o  year old male who has a history of cervical spondylosis, carpal tunnel, osteoarthritis  He does not follow with a cardiologist               The patient presented on 8/8 for elecitve carpal tunnel surgery; pre-op EKG showed atrial fibrillation with ventricular rate 108  Thus he was sent to ED for further evaluation  No known cardiac hx  Only prior EKG 1992 showed sinus rhythm  Pre-op evaluation done by PCP 7/26 notes regular heart rhythm, no EKG performed  In ED:  Labs: K 4 5, Mag 2 2, TSH 1 19  Cxray: NAD    He was on diltiazem infusion briefly; started on PO metoprolol tartrate; rates have been controlled with occasional bradycardia and a few pauses last evening, longest was 3 7 seconds  Overnight, no complaints  He feels well this AM    He has not been experiencing any chest pain, palpitations, lightheadedness or dizziness  Occasionally gets brief lightheadedness with position changes for past 3 years  No prior syncope  He drinks about 3 cups of coffee daily, occasional beer  No other elicit drugs  Lifelong non-smoker  He still works full time; lifts boxes ~ 35lbs on regular basis  No regular exercise regimen  Denies any prior hx of CVA, PE/DVT, HTN, CHF or DM  Review of Systems   Constitution: Negative for decreased appetite, fever, weakness and malaise/fatigue  Cardiovascular: Negative for chest pain, dyspnea on exertion, irregular heartbeat, leg swelling, orthopnea, palpitations and syncope  Respiratory: Negative for cough, shortness of breath and wheezing  Gastrointestinal: Negative for nausea and vomiting  Neurological: Negative for dizziness and light-headedness  Psychiatric/Behavioral: Negative for altered mental status  Historical Information   Past Medical History:   Diagnosis Date    Diverticulitis      Past Surgical History:   Procedure Laterality Date    COLON SURGERY      HERNIA REPAIR      REPLACEMENT TOTAL KNEE       History   Alcohol Use    1 2 oz/week    2 Cans of beer per week     History   Drug Use No     History   Smoking Status    Never Smoker   Smokeless Tobacco    Not on file     Family History: History reviewed  No pertinent family history  Meds/Allergies   current meds:   Current Facility-Administered Medications   Medication Dose Route Frequency    acetaminophen (TYLENOL) tablet 650 mg  650 mg Oral Q6H PRN    aluminum-magnesium hydroxide-simethicone (MYLANTA) 200-200-20 mg/5 mL oral suspension 30 mL  30 mL Oral Q6H PRN    aspirin (ECOTRIN) EC tablet 325 mg  325 mg Oral Daily    diltiazem (CARDIZEM) injection 15 mg  15 mg Intravenous Q4H PRN    docusate sodium (COLACE) capsule 100 mg  100 mg Oral BID    enoxaparin (LOVENOX) subcutaneous injection 40 mg  40 mg Subcutaneous Daily    metoprolol tartrate (LOPRESSOR) tablet 25 mg  25 mg Oral Q12H [facility]    ondansetron (ZOFRAN) injection 4 mg  4 mg Intravenous Q6H PRN        No Known Allergies    Objective   Vitals: Blood pressure 123/65, pulse 86, temperature 98 °F (36 7 °C), temperature source Oral, resp  rate 18, height 5' 7" (1 702 m), weight 87 5 kg (192 lb 14 4 oz), SpO2 93 %  ,     Body mass index is 30 21 kg/m²  ,     Systolic (81AZC), PDZ:048 , Min:105 , HERMINIO:350     Diastolic (02PZY), GXU:63, Min:59, Max:101      Intake/Output Summary (Last 24 hours) at 08/09/17 0803  Last data filed at 08/08/17 1959   Gross per 24 hour   Intake              240 ml   Output                0 ml   Net              240 ml     Weight (last 2 days)     Date/Time   Weight    08/08/17 1550 87 5 (192 9)            Invasive Devices     Peripheral Intravenous Line            Peripheral IV 08/08/17 Left Hand less than 1 day              Physical Exam   Constitutional: He is oriented to person, place, and time  No distress  Sitting up in chair in NAD   HENT:   Head: Normocephalic and atraumatic  Neck: No JVD present  Cardiovascular: Normal rate, regular rhythm, S1 normal and S2 normal     No murmur heard  No LE edema   Pulmonary/Chest: Effort normal and breath sounds normal  No respiratory distress  He has no wheezes  He has no rales  Musculoskeletal: He exhibits no edema  Neurological: He is alert and oriented to person, place, and time  No focal deficits on exam   Skin: Skin is warm  He is not diaphoretic  Psychiatric: He has a normal mood and affect   His behavior is normal      LABORATORY RESULTS:    Results from last 7 days  Lab Units 08/08/17  1437   TROPONIN I ng/mL <0 02     CBC with diff:   Results from last 7 days  Lab Units 08/09/17  0434 08/08/17  1335   WBC Thousand/uL  --  6 12   HEMOGLOBIN g/dL  --  15 9   HEMATOCRIT %  --  47 2   MCV fL  --  90   PLATELETS Thousands/uL 175 163   MCH pg  --  30 3   MCHC g/dL  --  33 7   RDW %  --  14 0   MPV fL 10 6 10 3     CMP:  Results from last 7 days  Lab Units 08/09/17  0434 08/08/17  1335   SODIUM mmol/L 142 143   POTASSIUM mmol/L 4 6 4 2   CHLORIDE mmol/L 107 105   CO2 mmol/L 29 28   ANION GAP mmol/L 6 10   BUN mg/dL 16 14   CREATININE mg/dL 0 88 0 91   GLUCOSE RANDOM mg/dL 94 92   CALCIUM mg/dL 9 0 9 1   EGFR ml/min/1 73sq m 85 83     BMP:  Results from last 7 days  Lab Units 08/09/17  0434 08/08/17  1335   SODIUM mmol/L 142 143   POTASSIUM mmol/L 4 6 4 2   CHLORIDE mmol/L 107 105   CO2 mmol/L 29 28   BUN mg/dL 16 14   CREATININE mg/dL 0 88 0 91   GLUCOSE RANDOM mg/dL 94 92   CALCIUM mg/dL 9 0 9 1        Results from last 7 days  Lab Units 08/09/17  0434   MAGNESIUM mg/dL 2 2        Results from last 7 days  Lab Units 08/09/17  0434 TSH 3RD GENERATON uIU/mL 1 190       Results from last 7 days  Lab Units 08/08/17  1335   INR  0 93     Lipid Profile:   Lab Results   Component Value Date    CHOL 138 08/09/2017     Lab Results   Component Value Date    HDL 43 08/09/2017     Lab Results   Component Value Date    LDLCALC 74 08/09/2017     Lab Results   Component Value Date    TRIG 107 08/09/2017     Imaging: I have personally reviewed pertinent reports  Xr Chest 2 Views    Result Date: 8/8/2017  Narrative: CHEST INDICATION:  Atrial fibrillation  COMPARISON:  None VIEWS:  Frontal and lateral projections IMAGES:  3 FINDINGS:     Cardiomediastinal silhouette appears unremarkable  The lungs are clear  No pneumothorax or pleural effusion  Visualized osseous structures appear within normal limits for the patient's age  Impression: No acute abnormality in the chest  Workstation performed: [id number]     EKG reviewed personally:   8/8/17  Atrial fibrillation,  Ventricular rate 108    Telemetry reviewed personally:   Atrial fibrillation  Rate currently 79  Occasional bradycardia, 3 7 second pause  Assessment  Principal Problem:    Atrial fibrillation  Active Problems:    Carpal tunnel syndrome of right wrist      Assessment/ Plan:    Atrial fibrillation: New diagnosis  Pt is asymptomatic, unclear duration  No recent prior EKGs  [id number] 1; will increase to 2; 11/2017 when he turns 77  Check Echo,   Continue beta blocker for rate control  To Discuss anticoagulation with attending  Thank you for allowing us to participate in this patient's care  This pt will follow up with Dr Miriam Stinson once discharged  Counseling / Coordination of Care  Total floor / unit time spent today 40 minutes  Greater than 50% of total time was spent with the patient and / or family counseling and / or coordination of care  A description of the counseling / coordination of care: Review of history, current assessment, development of a plan        Code Status: Level 1 - Full Code    ** Please Note: Dragon 360 Dictation voice to text software may have been used in the creation of this document   **

## 2017-08-14 ENCOUNTER — HOSPITAL ENCOUNTER (OUTPATIENT)
Dept: NON INVASIVE DIAGNOSTICS | Facility: CLINIC | Age: 77
Discharge: HOME | End: 2017-08-14
Payer: COMMERCIAL

## 2017-08-14 DIAGNOSIS — I48.91 ATRIAL FIBRILLATION (HCC): ICD-10-CM

## 2017-08-14 PROCEDURE — 93225 XTRNL ECG REC<48 HRS REC: CPT

## 2017-08-14 PROCEDURE — 93226 XTRNL ECG REC<48 HR SCAN A/R: CPT

## 2017-08-17 ENCOUNTER — TRANSCRIBE ORDERS (OUTPATIENT)
Dept: ADMINISTRATIVE | Facility: HOSPITAL | Age: 77
End: 2017-08-17

## 2017-08-17 ENCOUNTER — ALLSCRIPTS OFFICE VISIT (OUTPATIENT)
Dept: OTHER | Facility: OTHER | Age: 77
End: 2017-08-17

## 2017-08-17 DIAGNOSIS — I48.91 ATRIAL FIBRILLATION, UNSPECIFIED TYPE (HCC): Primary | ICD-10-CM

## 2017-08-21 ENCOUNTER — ALLSCRIPTS OFFICE VISIT (OUTPATIENT)
Dept: OTHER | Facility: OTHER | Age: 77
End: 2017-08-21

## 2017-08-30 ENCOUNTER — HOSPITAL ENCOUNTER (OUTPATIENT)
Dept: NON INVASIVE DIAGNOSTICS | Facility: CLINIC | Age: 77
Discharge: HOME | End: 2017-08-30
Payer: COMMERCIAL

## 2017-08-30 DIAGNOSIS — I48.91 ATRIAL FIBRILLATION, UNSPECIFIED TYPE (HCC): ICD-10-CM

## 2017-08-30 PROCEDURE — 93308 TTE F-UP OR LMTD: CPT

## 2017-09-20 ENCOUNTER — GENERIC CONVERSION - ENCOUNTER (OUTPATIENT)
Dept: OTHER | Facility: OTHER | Age: 77
End: 2017-09-20

## 2017-10-03 ENCOUNTER — ANESTHESIA EVENT (OUTPATIENT)
Dept: PERIOP | Facility: HOSPITAL | Age: 77
End: 2017-10-03
Payer: COMMERCIAL

## 2017-10-03 ENCOUNTER — ANESTHESIA (OUTPATIENT)
Dept: PERIOP | Facility: HOSPITAL | Age: 77
End: 2017-10-03
Payer: COMMERCIAL

## 2017-10-03 ENCOUNTER — HOSPITAL ENCOUNTER (OUTPATIENT)
Facility: HOSPITAL | Age: 77
Setting detail: OUTPATIENT SURGERY
Discharge: HOME | End: 2017-10-03
Attending: ORTHOPAEDIC SURGERY | Admitting: ORTHOPAEDIC SURGERY
Payer: COMMERCIAL

## 2017-10-03 VITALS
HEART RATE: 80 BPM | RESPIRATION RATE: 18 BRPM | DIASTOLIC BLOOD PRESSURE: 76 MMHG | TEMPERATURE: 96.9 F | WEIGHT: 198 LBS | HEIGHT: 67 IN | BODY MASS INDEX: 31.08 KG/M2 | OXYGEN SATURATION: 96 % | SYSTOLIC BLOOD PRESSURE: 136 MMHG

## 2017-10-03 RX ORDER — SODIUM CHLORIDE, SODIUM LACTATE, POTASSIUM CHLORIDE, CALCIUM CHLORIDE 600; 310; 30; 20 MG/100ML; MG/100ML; MG/100ML; MG/100ML
INJECTION, SOLUTION INTRAVENOUS CONTINUOUS PRN
Status: DISCONTINUED | OUTPATIENT
Start: 2017-10-03 | End: 2017-10-03 | Stop reason: SURG

## 2017-10-03 RX ORDER — MORPHINE SULFATE 4 MG/ML
2 INJECTION, SOLUTION INTRAMUSCULAR; INTRAVENOUS EVERY 4 HOURS PRN
Status: CANCELLED | OUTPATIENT
Start: 2017-10-03

## 2017-10-03 RX ORDER — PROMETHAZINE HYDROCHLORIDE 25 MG/ML
12.5 INJECTION, SOLUTION INTRAMUSCULAR; INTRAVENOUS ONCE AS NEEDED
Status: CANCELLED | OUTPATIENT
Start: 2017-10-03

## 2017-10-03 RX ORDER — SODIUM CHLORIDE, SODIUM LACTATE, POTASSIUM CHLORIDE, CALCIUM CHLORIDE 600; 310; 30; 20 MG/100ML; MG/100ML; MG/100ML; MG/100ML
100 INJECTION, SOLUTION INTRAVENOUS CONTINUOUS
Status: CANCELLED | OUTPATIENT
Start: 2017-10-03

## 2017-10-03 RX ORDER — PROPOFOL 10 MG/ML
INJECTION, EMULSION INTRAVENOUS CONTINUOUS PRN
Status: DISCONTINUED | OUTPATIENT
Start: 2017-10-03 | End: 2017-10-03 | Stop reason: SURG

## 2017-10-03 RX ORDER — OXYCODONE HYDROCHLORIDE 5 MG/1
10 TABLET ORAL EVERY 4 HOURS PRN
Status: CANCELLED | OUTPATIENT
Start: 2017-10-03

## 2017-10-03 RX ORDER — LIDOCAINE HYDROCHLORIDE 10 MG/ML
INJECTION, SOLUTION EPIDURAL; INFILTRATION; INTRACAUDAL; PERINEURAL AS NEEDED
Status: DISCONTINUED | OUTPATIENT
Start: 2017-10-03 | End: 2017-10-03 | Stop reason: SURG

## 2017-10-03 RX ORDER — FENTANYL CITRATE/PF 50 MCG/ML
25 SYRINGE (ML) INJECTION
Status: CANCELLED | OUTPATIENT
Start: 2017-10-03

## 2017-10-03 RX ORDER — ONDANSETRON 2 MG/ML
4 INJECTION INTRAMUSCULAR; INTRAVENOUS ONCE AS NEEDED
Status: CANCELLED | OUTPATIENT
Start: 2017-10-03

## 2017-10-03 RX ORDER — ACETAMINOPHEN 325 MG/1
650 TABLET ORAL EVERY 6 HOURS PRN
Status: CANCELLED | OUTPATIENT
Start: 2017-10-03

## 2017-10-03 RX ORDER — ONDANSETRON 2 MG/ML
4 INJECTION INTRAMUSCULAR; INTRAVENOUS EVERY 6 HOURS PRN
Status: CANCELLED | OUTPATIENT
Start: 2017-10-03

## 2017-10-03 RX ORDER — BUPIVACAINE HYDROCHLORIDE 2.5 MG/ML
INJECTION, SOLUTION INFILTRATION; PERINEURAL AS NEEDED
Status: DISCONTINUED | OUTPATIENT
Start: 2017-10-03 | End: 2017-10-03 | Stop reason: HOSPADM

## 2017-10-03 RX ORDER — GABAPENTIN 100 MG/1
100 CAPSULE ORAL EVERY 8 HOURS SCHEDULED
Status: CANCELLED | OUTPATIENT
Start: 2017-10-03

## 2017-10-03 RX ORDER — PROPOFOL 10 MG/ML
INJECTION, EMULSION INTRAVENOUS AS NEEDED
Status: DISCONTINUED | OUTPATIENT
Start: 2017-10-03 | End: 2017-10-03 | Stop reason: SURG

## 2017-10-03 RX ORDER — METOCLOPRAMIDE HYDROCHLORIDE 5 MG/ML
10 INJECTION INTRAMUSCULAR; INTRAVENOUS ONCE AS NEEDED
Status: CANCELLED | OUTPATIENT
Start: 2017-10-03

## 2017-10-03 RX ORDER — OXYCODONE HYDROCHLORIDE 5 MG/1
5 TABLET ORAL EVERY 4 HOURS PRN
Status: CANCELLED | OUTPATIENT
Start: 2017-10-03

## 2017-10-03 RX ORDER — OXYCODONE AND ACETAMINOPHEN 2.5; 325 MG/1; MG/1
1 TABLET ORAL EVERY 6 HOURS PRN
Qty: 8 TABLET | Refills: 0 | Status: SHIPPED | OUTPATIENT
Start: 2017-10-03 | End: 2018-04-04

## 2017-10-03 RX ADMIN — CEFAZOLIN SODIUM 2000 MG: 2 SOLUTION INTRAVENOUS at 14:59

## 2017-10-03 RX ADMIN — LIDOCAINE HYDROCHLORIDE 50 MG: 10 INJECTION, SOLUTION EPIDURAL; INFILTRATION; INTRACAUDAL; PERINEURAL at 14:59

## 2017-10-03 RX ADMIN — PROPOFOL 100 MCG/KG/MIN: 10 INJECTION, EMULSION INTRAVENOUS at 14:59

## 2017-10-03 RX ADMIN — PROPOFOL 100 MG: 10 INJECTION, EMULSION INTRAVENOUS at 14:59

## 2017-10-03 RX ADMIN — SODIUM CHLORIDE, SODIUM LACTATE, POTASSIUM CHLORIDE, AND CALCIUM CHLORIDE: .6; .31; .03; .02 INJECTION, SOLUTION INTRAVENOUS at 14:39

## 2017-10-03 NOTE — DISCHARGE INSTRUCTIONS
Discharge Instructions:    Weight bearing:  Do not put direct pressure over the incision  Do not lift with this hand until postoperative appointment  Dressings:  Keep the dressings clean, dry, and intact for 3 days  May remove dressings after 3 days and shower  Please clean the incision with alcohol after showers until postoperative appointment  May apply band-aids as needed  DO NOT SOAK THE INCISION                              Pain:  You have been prescribed a narcotic  Please take this sparingly and only AS NEEDED for pain  Appt Instructions: If you do not have your appointment, please call the clinic at [phone] to f/u with Dr Latoya Tracey in 2 weeks from date of surgery

## 2017-10-03 NOTE — OP NOTE
OPERATIVE REPORT  PATIENT NAME: Sae Ponce    :  1940  MRN: [id number]  Pt Location: AN OR ROOM 01    SURGERY DATE: 10/3/2017    Surgeon(s) and Role:     DO Dania Golden Primary    Preop Diagnosis:  Bilateral carpal tunnel syndrome [G56 03]    Post-Op Diagnosis Codes:     * Bilateral carpal tunnel syndrome [G56 03]    Procedure(s) (LRB):  CARPAL TUNNEL RELEASE (Right)    Specimen(s):  * No specimens in log *    Estimated Blood Loss:   Minimal    Drains:       Anesthesia Type:   IV Sedation with Anesthesia    Operative Indications:  Bilateral carpal tunnel syndrome [G56 03]  Right carpal tunnel    Operative Findings: Thickened transverse carpal ligament    Complications:   None    Procedure and Technique:  Patient was seen and examined in the preoperative area  The right upper extremity was marked, the consent an H&P had been reviewed  The patient was brought back to the operative suite  The patient was intubated sedated  Tourniquet was applied to right upper  The right upper extremity was prepped and draped in a sterile fashion  After proper timeout commenced and identified the right upper extremity as the operative site  Esmarch was utilized to exsanguinate the extremity, the tourniquet was turned up to 250 mmHg  Tissue was made in the Duncan's cardinal lines  Bovie was used to electrocauterize any bleeding  We used Metzenbaum scissors to dissect down to the carpal tunnel ligament  We used a 10 blade to incise into the carpal tunnel ligament and used a [place] elevator to protect the median nerve while dissecting proximally and distally the carpal tunnel ligament, while releasing the carpal tunnel ligament  We confirmed proper release  We irrigated wound the tourniquet was taken down after 10 minutes  Bleeding was cauterized incision was closed with 3 O nylon     Xeroform was applied to the skin a cut 4 x 4 and a Tegaderm were applied to the hand the patient was taken back to PACU after anesthesia was reversed     I was present for the entire procedure     I was present for the entire procedure and A qualified resident physician was not available    Patient Disposition:  PACU     SIGNATURE: Dana Milton DO  DATE: October 3, 2017  TIME: 2:17 PM

## 2017-10-03 NOTE — ANESTHESIA PREPROCEDURE EVALUATION
Review of Systems/Medical History  Patient summary reviewed  Chart reviewed  No history of anesthetic complications     Cardiovascular  Negative cardio ROS Exercise tolerance: good,  Hypertension controlled, Dysrhythmias, atrial fibrillation,    Pulmonary  Negative pulmonary ROS ,        GI/Hepatic  Negative GI/hepatic ROS          Negative  ROS        Endo/Other  Negative endo/other ROS      GYN  Negative gynecology ROS          Hematology  Negative hematology ROS      Musculoskeletal  Negative musculoskeletal ROS        Neurology    Neuromuscular disease (Median nerve entrapment) ,    Psychology   Negative psychology ROS            Physical Exam    Airway    Mallampati score: II  TM Distance: >3 FB  Neck ROM: full     Dental   No notable dental hx     Cardiovascular  Comment: Negative ROS,     Pulmonary      Other Findings        Anesthesia Plan  ASA Score- 2       Anesthesia Type- IV sedation with anesthesia        Induction- intravenous      Informed Consent  Anesthetic plan and risks discussed with patient

## 2017-10-13 ENCOUNTER — ALLSCRIPTS OFFICE VISIT (OUTPATIENT)
Dept: OTHER | Facility: OTHER | Age: 77
End: 2017-10-13

## 2017-10-19 NOTE — PROGRESS NOTES
Assessment  1  Carpal tunnel syndrome of right wrist (354 0) (G56 01)    Plan   · Follow-up Visit in 4 Weeks Evaluation and Treatment  Follow-up  Status: Complete   Done: 27BBF2932    Discussion/Summary    Right carpal tunnel release 10/3/17    gradually return to normal activities as tolerated  encouraged range of motion exercises and scar massage  Follow up in 4 weeks for repeat evaluation  Chief Complaint  Right carpal tunnel release 10/3/17      Post-Op  HPI: 79-year-old male returns to the office today status post Right carpal tunnel release 10/3/17  today he denies any pain  He denies incisional erythema or drainage  She does note swelling and stiffness about the hand and digits  He states this is improving with time  He is doing home exercises for this  He states the numbness and tingling has improved significantly as well but is still present  prior to his surgery he was unable to pickup small coins, now he is able to do this  He denies fevers or chills  He is happy with his progress  Active Problems  1  Atrial fibrillation (427 31) (I48 91)   2  Bilateral carpal tunnel syndrome (354 0) (G56 03)   3  Carpal tunnel syndrome of right wrist (354 0) (G56 01)   4  Cervical spondylosis (721 0) (M47 812)   5  Diverticulosis (562 10) (K57 90)   6  Osteoarthritis of left knee (715 96) (M17 12)   7  Pre-operative examination (V72 84) (Z01 818)    Current Meds   1  Eliquis 5 MG Oral Tablet; Take 1 tablet twice daily  Requested for: 77DFX2111; Last   Rx:04Oct2017 Ordered   2  Metoprolol Tartrate 25 MG Oral Tablet; TAKE 1 TABLET TWICE DAILY  Requested for:   17Aug2017; Last Rx:17Aug2017 Ordered    Allergies  1  No Known Drug Allergies    Vitals  Signs   Heart Rate: 570  Systolic: 671  Diastolic: 85  Height: 5 ft 7 in  Weight: 198 lb 2 0 oz  BMI Calculated: 31 03  BSA Calculated: 2 01    Physical Exam    Right hand: No gross deformity  Skin intact  Incision well healed without erythema or drainage    Sutures removed  Slight swelling about the incision  Near full composite fist  formation  Sensation intact median ulnar and radial nerves  2+ radial pulse          Attending Note  Collaborating Physician Note: Collaborating Physician: I agree with the Advanced Practitioner note        Future Appointments    Date/Time Provider Specialty Site   10/30/2017 01:40 PM Rambo Tucker DO Cardiology [facility]   11/08/2017 09:20 AM Jasiel Patrick DO Orthopedic Surgery [facility] [address]     Signatures   Electronically signed by : Sandra Rocha [facility]; Oct 13 2017  8:20AM EST                       (Author)    Electronically signed by : Marine Wall DO; Oct 18 2017 12:13PM EST                       (Author)

## 2017-10-23 NOTE — PROGRESS NOTES
Assessment  1  Carpal tunnel syndrome of right wrist (354 0) (G56 01)    Discussion/Summary    Right carpal tunnel syndrome atrial fibrillation#1 patient will need clearance from cardiology we'll also need to stop eloquis for 5 days before surgery  Chief Complaint  1  Wrist Pain    History of Present Illness  HPI: Patient comes in today with regards to his wrist he has carpal tunnel bilaterally he was scheduled to the right wrist open carpal tunnel release  He was preoperative and had developed atrial fibrillation  He cancel the surgery and he was sent to cardiology  He is now on eloquent this having a controlling medication      Active Problems  1  Atrial fibrillation (427 31) (I48 91)   2  Bilateral carpal tunnel syndrome (354 0) (G56 03)   3  Carpal tunnel syndrome of right wrist (354 0) (G56 01)   4  Cervical spondylosis (721 0) (M47 812)   5  Diverticulosis (562 10) (K57 90)   6  Osteoarthritis of left knee (715 96) (M17 12)   7  Pre-operative examination (V72 84) (Z01 818)    Current Meds   1  Eliquis 5 MG Oral Tablet; Take 1 tablet twice daily; Therapy: (Recorded:51Nai9101) to Recorded   2  Metoprolol Tartrate 25 MG Oral Tablet; TAKE 1 TABLET TWICE DAILY  Requested for:   17Aug2017; Last Rx:17Aug2017 Ordered    Allergies  1   No Known Drug Allergies    Vitals  Signs   Heart Rate: 66  Systolic: 868  Diastolic: 69  Height: 5 ft 7 in  Weight: 198 lb 2 08 oz  BMI Calculated: 31 03  BSA Calculated: 2 01    Future Appointments    Date/Time Provider Specialty Site   10/30/2017 01:40 PM Natalie James DO Cardiology [address]     Signatures   Electronically signed by : Leon Olivares DO; Aug 21 2017  3:32PM EST                       (Author)

## 2017-10-30 ENCOUNTER — ALLSCRIPTS OFFICE VISIT (OUTPATIENT)
Dept: OTHER | Facility: OTHER | Age: 77
End: 2017-10-30

## 2017-10-30 DIAGNOSIS — I48.91 ATRIAL FIBRILLATION (HCC): ICD-10-CM

## 2017-10-31 NOTE — PROGRESS NOTES
Assessment  Assessed    1  Atrial fibrillation (427 31) (I48 91)    Plan  Atrial fibrillation    · EKG/ECG- POC; Status:Complete;   Done: 12HDM9421   Perform: In Office; [phone]; Last Updated Chemo Gambino; 10/30/2017 1:55:48 PM;Ordered; For:Atrial fibrillation; Ordered By:Av Brumfield;   · HOLTER MONITOR - 24 HOUR; Status:Active; Requested GRP:[id number];    Perform:[facility]; ZPK:50RLG0530; Last Updated By:Lidia Richey; 10/30/2017 2:13:01 PM;Ordered; For:Atrial fibrillation; Ordered By:Av Brumfield;   · Follow-up visit in 6 months Evaluation and Treatment  Follow-up  Status: Complete   Done: 70JPD1581   Ordered; For: Atrial fibrillation; Ordered By: Renzo Mcallister Performed:  Due: 06GQP5089; Last Updated By: Marie Martinez; 10/30/2017 2:13:01 PM    Discussion/Summary  Cardiology Discussion Summary Free Text Note Form [facility]:   Mohinder Bojorquezks continue with a rate control strategy  Check Holter monitor may need to adjust beta blocker dose  Continue anticoagulation  Echocardiogram was reviewed  Instructed him to hydrate better and decrease caffeine  Chief Complaint  Chief Complaint Free Text Note Form: Pt  here for 2 month follow up  Pt  has no complaints  History of Present Illness  Cardiology HPI Free Text Note Form [facility]: Pt seen post hospital followup for Afib  He has been feeling much better without any symptoms  He was started on Eliquis and metoprolol but tells me he never got a script for the bbl  Holter showed AVG   Activity level has increased  Denies CP,SOB, Palps, PND, orthopnea, or syncope  There is no lower extremity edema  Tolerating anticoagulation well    here today for follow-up visit  He underwent surgery for carpal tunnel area overall he states he's been feeling great with no complaints  Functional capacity has significantly prove  He is now taking the beta blocker for rate control  He's been on anticoagulation with no adverse events   He denies any chest pain, shortness of rest, palpitations, lightheaded, dizziness, or syncope area echocardiogram remains unchanged with a low-normal ejection fraction of 50-55%      Review of Systems  Cardiology Male ROS:     Cardiac: No complaints of chest pain, no palpitations, no fainiting  Skin: No complaints of nonhealing sores or skin rash  Genitourinary: No complaints of recurrent urinary tract infections, frequent urination at night, difficult urination, blood in urine, kidney stones, loss of bladder control, no kidney or prostate problems, no erectile dysfunction  Psychological: No complaints of feeling depressed, anxiety, panic attacks, or difficulty concentrating  General: No complaints of trouble sleeping, lack of energy, fatigue, appetite changes, weight changes, fever, frequent infections, or night sweats  Respiratory: No complaints of shortness of breath, cough with sputum, or wheezing  HEENT: No complaints of serious problems, hearing problems, nose problems, throat problems, or snoring  Gastrointestinal: No complaints of liver problems, nausea, vomiting, heartburn, constipation, bloody stools, diarrhea, problems swallowing, adbominal pain, or rectal bleeding  Hematologic: No complaints of bleeding disorders, anemia, blood clots, or excessive brusing  Neurological: No complaints of numbness, tingling, dizziness, weakness, seizures, headaches, syncope or fainting, AM fatigue, daytime sleepiness, no witnessed apnea episodes  Musculoskeletal: No complaints of arthritis, back pain, or painfull swelling  Active Problems  Problems    1  Atrial fibrillation (427 31) (I48 91)   2  Bilateral carpal tunnel syndrome (354 0) (G56 03)   3  Carpal tunnel syndrome of right wrist (354 0) (G56 01)   4  Cervical spondylosis (721 0) (M47 812)   5  Diverticulosis (562 10) (K57 90)   6  Osteoarthritis of left knee (715 96) (M17 12)   7   Pre-operative examination (V72 84) (T54 408)    Past Medical History  Problems    1  History of Diverticulitis (562 11) (K57 92)  Active Problems And Past Medical History Reviewed: The active problems and past medical history were reviewed and updated today  Surgical History  Problems    1  History of Inguinal Hernia Repair   2  History of Partial Colectomy   3  History of Total Knee Replacement Right   4  History of Umbilical Hernia Repair  Surgical History Reviewed: The surgical history was reviewed and updated today  Family History  Father    1  Family history of malignant neoplasm (V16 9) (Z80 9)  Family History Reviewed: The family history was reviewed and updated today  Social History  Problems    · Never a smoker   · Occasional alcohol use  Social History Reviewed: The social history was reviewed and updated today  Current Meds   1  Eliquis 5 MG Oral Tablet; Take 1 tablet twice daily  Requested for: 49UBT1166; Last   Rx:04Oct2017 Ordered   2  Metoprolol Tartrate 25 MG Oral Tablet; TAKE 1 TABLET TWICE DAILY  Requested for:   17Aug2017; Last Rx:17Aug2017 Ordered    Allergies  Medication    1  No Known Drug Allergies    Vitals  Vital Signs    Recorded: 22AJG6406 01:54PM   Heart Rate 96, Apical   Pulse Quality Irregular, Apical   Systolic 599, LUE, Sitting   Diastolic 82, LUE, Sitting   Height 5 ft 7 in   Weight 198 lb    BMI Calculated 31 01   BSA Calculated 2 01     Physical Exam    Constitutional   General appearance: No acute distress, well appearing and well nourished  Eyes   Conjunctiva and Sclera examination: Conjunctiva pink, sclera anicteric  Ears, Nose, Mouth, and Throat - Oropharynx: Clear, nares are clear, mucous membranes are moist    Neck   Neck and thyroid: Normal, supple, trachea midline, no thyromegaly  Pulmonary   Respiratory effort: No increased work of breathing or signs of respiratory distress  Auscultation of lungs: Clear to auscultation, no rales, no rhonchi, no wheezing, good air movement      Cardiovascular Auscultation of heart: Abnormal   The heart rate was normal  The rhythm was irregularly irregular  no murmurs were heard  Carotid pulses: Normal, 2+ bilaterally  Peripheral vascular exam: Normal pulses throughout, no tenderness, erythema or swelling  Pedal pulses: Normal, 2+ bilaterally  Examination of extremities for edema and/or varicosities: Normal     Abdomen   Abdomen: Non-tender and no distention  Liver and spleen: No hepatomegaly or splenomegaly  Musculoskeletal Gait and station: Normal gait  -- Digits and nails: Normal without clubbing or cyanosis  -- Inspection/palpation of joints, bones, and muscles: Normal, ROM normal     Skin - Skin and subcutaneous tissue: Normal without rashes or lesions  Skin is warm and well perfused, normal turgor  Neurologic - Cranial nerves: II - XII intact  -- Speech: Normal     Psychiatric - Orientation to person, place, and time: Normal -- Mood and affect: Normal       Results/Data  ECG Report: Atrial fibrillation nonspecific ST and T changes      Future Appointments    Date/Time Provider Specialty Site   11/08/2017 09:20 AM Charlene Mcguire DO Orthopedic Surgery [facility] [address]     Signatures   Electronically signed by : Ellei Higginbotham DO; Oct 30 2017  2:23PM EST                       (Author)

## 2017-11-06 ENCOUNTER — HOSPITAL ENCOUNTER (OUTPATIENT)
Dept: NON INVASIVE DIAGNOSTICS | Facility: CLINIC | Age: 77
Discharge: HOME | End: 2017-11-06
Payer: COMMERCIAL

## 2017-11-06 DIAGNOSIS — I48.91 ATRIAL FIBRILLATION (HCC): ICD-10-CM

## 2017-11-06 PROCEDURE — 93226 XTRNL ECG REC<48 HR SCAN A/R: CPT

## 2017-11-06 PROCEDURE — 93225 XTRNL ECG REC<48 HRS REC: CPT

## 2018-01-12 VITALS
WEIGHT: 197 LBS | HEIGHT: 67 IN | BODY MASS INDEX: 30.92 KG/M2 | DIASTOLIC BLOOD PRESSURE: 70 MMHG | RESPIRATION RATE: 16 BRPM | HEART RATE: 62 BPM | SYSTOLIC BLOOD PRESSURE: 128 MMHG | TEMPERATURE: 95.9 F

## 2018-01-12 VITALS
BODY MASS INDEX: 31.1 KG/M2 | DIASTOLIC BLOOD PRESSURE: 81 MMHG | HEART RATE: 58 BPM | SYSTOLIC BLOOD PRESSURE: 146 MMHG | WEIGHT: 198.13 LBS | HEIGHT: 67 IN

## 2018-01-13 VITALS
HEIGHT: 67 IN | BODY MASS INDEX: 31.1 KG/M2 | SYSTOLIC BLOOD PRESSURE: 116 MMHG | HEART RATE: 101 BPM | DIASTOLIC BLOOD PRESSURE: 85 MMHG | WEIGHT: 198.13 LBS

## 2018-01-13 VITALS
TEMPERATURE: 95.8 F | DIASTOLIC BLOOD PRESSURE: 92 MMHG | WEIGHT: 198.2 LBS | BODY MASS INDEX: 31.11 KG/M2 | HEIGHT: 67 IN | SYSTOLIC BLOOD PRESSURE: 132 MMHG | HEART RATE: 80 BPM | RESPIRATION RATE: 16 BRPM

## 2018-01-14 VITALS
HEIGHT: 67 IN | DIASTOLIC BLOOD PRESSURE: 81 MMHG | SYSTOLIC BLOOD PRESSURE: 146 MMHG | WEIGHT: 199.5 LBS | HEART RATE: 58 BPM | BODY MASS INDEX: 31.31 KG/M2

## 2018-01-14 VITALS
DIASTOLIC BLOOD PRESSURE: 69 MMHG | BODY MASS INDEX: 31.1 KG/M2 | HEART RATE: 66 BPM | WEIGHT: 198.13 LBS | SYSTOLIC BLOOD PRESSURE: 107 MMHG | HEIGHT: 67 IN

## 2018-01-14 VITALS
DIASTOLIC BLOOD PRESSURE: 82 MMHG | SYSTOLIC BLOOD PRESSURE: 116 MMHG | WEIGHT: 198 LBS | HEART RATE: 96 BPM | BODY MASS INDEX: 31.08 KG/M2 | HEIGHT: 67 IN

## 2018-01-15 NOTE — PROGRESS NOTES
Assessment    1  Encounter for preventive health examination (V70 0) (Z00 00)    Discussion/Summary    Patient not interested in labs or vaccines  Follow-up in one year  Impression: Subsequent Annual Wellness Visit, with preventive exam as well as age and risk appropriate counseling completed  Cardiovascular screening and counseling: screening not indicated, counseling was given on maintaining a healthy diet and counseling was given on maintaining a healthy weight  Diabetes screening and counseling: the patient declines screening  Colorectal cancer screening and counseling: the patient declines screening  Prostate cancer screening and counseling: screening not indicated  Osteoporosis screening and counseling: screening not indicated  Abdominal aortic aneurysm screening and counseling: screening not indicated  Glaucoma screening and counseling: screening is current  Immunizations: the patient declines the influenza vaccination, the patient declines the pneumococcal vaccination and the patient declines the Zostavax vaccine  Advice and education were given regarding increasing physical activity, nutrition (non-diabetic) and weight loss  Patient Discussion: follow-up visit needed in one year  History of Present Illness  HPI: First office visit for this 70year-old male  Active medical problems and past medical history see chart  Medications none  prior cholesterol screening cholesterol < 200  Welcome to [employer] and Wellness Visits: The patient is being seen for the subsequent annual wellness visit  Medicare Screening and Risk Factors   Hospitalizations: he has been previously hospitalizied  Once per lifetime medicare screening tests: ECG has not been done  Medicare Screening Tests Risk Questions   Abdominal aortic aneurysm risk assessment: over 72years of age  Osteoporosis risk assessment: over 48years of age  Drug and Alcohol Use: The patient has never smoked cigarettes   The patient reports occasional alcohol use  Diet and Physical Activity: Current diet includes well balanced meals  Exercise: walking, still working  Mood Disorder and Cognitive Impairment Screening: He denies feeling down, depressed, or hopeless over the past two weeks  He denies feeling little interest or pleasure in doing things over the past two weeks  Cognitive impairment screening: denies difficulty learning/retaining new information, denies difficulty handling complex tasks, denies difficulty with reasoning, denies difficulty with spatial ability and orientation, denies difficulty with language and denies difficulty with behavior  Functional Ability/Level of Safety: Hearing is a hearing aid is not used  He reports hearing difficulties  The patient is currently able to do activities of daily living without limitations, able to do instrumental activities of daily living without limitations and able to drive without limitations  Fall risk factors: The patient fell 0 times in the past 12 months , no polypharmacy, no mobility impairment, no antidepressant use, no deconditioning, no postural hypotension, no sedative use, no visual impairment, no urinary incontinence, no antihypertensive use, no cognitive impairment and no previous fall  Advance Directives: Advance directives: durable power of  for health care directives, but no living will  Co-Managers and Medical Equipment/Suppliers: See Patient Care Team      Patient Care Team    Care Team Member Role Specialty Office Number   [phone] [facility],[address]  Colon and Rectal Surgery [phone]     Review of Systems    Constitutional: no recent weight changes  Eyes: cataracts eye exam < 1 year ago  ENT: no hearing loss  Cardiovascular: no chest pain and no palpitations  Respiratory: no shortness of breath, no wheezing, not sleeping upright or with extra pillows and no cough     Gastrointestinal: diverticulitis s/p partial colon resection in the 1990s  last colonoscopy > 5 years ago , but no abdominal pain, no nausea, no diarrhea, no constipation, no bright red blood per rectum and no melena  Genitourinary: no dysuria, no urinary incontinence, no urinary hesitancy and no nocturia  Musculoskeletal: joint stiffness and OA knees  s/p right TKR, but no joint swelling  Integumentary and Breasts: no rashes and no skin lesions  Neurological: no headache, no dizziness and no difficulty walking  Psychiatric: no anxiety and no depression  Endocrine: no muscle weakness  Active Problems    1  Osteoarthritis of left knee (715 96) (M17 9)    Past Medical History    · History of Diverticulitis (562 11) (K57 92)    Surgical History    · History of Inguinal Hernia Repair   · History of Partial Colectomy   · History of Total Knee Replacement Right   · History of Umbilical Hernia Repair    Family History  Father    · Family history of malignant neoplasm (V16 9) (Z80 9)    Social History    · Never smoked cigarettes (V49 89) (Z78 9)   · Occasional alcohol use    Current Meds   1  No Reported Medications Recorded    Allergies    1  No Known Drug Allergies    Vitals  Signs    Systolic: 769  Diastolic: 70  Heart Rate: 76  Respiration: 16  Temperature: 96 8 F  Height: 5 ft 7 1 in  Weight: 195 lb 6 08 oz  BMI Calculated: 30 51  BSA Calculated: 2    Physical Exam    Constitutional   General appearance: No acute distress, well appearing and well nourished  Eyes   Conjunctiva and lids: No erythema, swelling or discharge  fundi not seen  Pupils and irises: Equal, round, reactive to light  Ears, Nose, Mouth, and Throat   Otoscopic examination: Tympanic membranes translucent with normal light reflex  Canals patent without erythema  Oropharynx: Normal with no erythema, edema, exudate or lesions  Neck   Neck: Supple, symmetric, trachea midline, no masses  Thyroid: Normal, no thyromegaly  There were no thyroid nodules     Pulmonary   Auscultation of lungs: Clear to auscultation  Cardiovascular   Auscultation of heart: Normal rate and rhythm, normal S1 and S2, no murmurs  Heart sounds: no gallop heard  Carotid pulses: 2+ bilaterally  no bruit heard over the right carotid and no bruit heard over the left carotid  Abdominal aorta: Normal   Abdominal aorta: no bruit heard  Examination of extremities for edema and/or varicosities: Normal     Abdomen   Abdomen: Non-tender, no masses  Liver and spleen: No hepatomegaly or splenomegaly  Lymphatic   Palpation of lymph nodes in neck: No lymphadenopathy  no anterior cervical node enlargement, no posterior cervical node enlargement, no submandibular node enlargement and no supraclavicular node enlargement  Musculoskeletal   Gait and station: Normal     Inspection/palpation of joints, bones, and muscles: Abnormal   crepitus left knee  no joint line tenderness  no effusion  Range of motion: Normal   Range of Motion: Right Hip: Full  Left Hip: Full  Right Knee: Full  Left Knee: Full  Muscle strength/tone: Normal     Neurologic   Cortical function: Normal mental status  Psychiatric   Recent and remote memory: Intact      Mood and affect: Normal        Signatures   Electronically signed by : DOMENIC Samaniego ; Jul 26 2016  6:19PM EST                       (Author)

## 2018-01-15 NOTE — PROGRESS NOTES
Assessment  Assessed    1  Atrial fibrillation (427 31) (I48 91)    Plan  Atrial fibrillation    · Metoprolol Tartrate 25 MG Oral Tablet; TAKE 1 TABLET TWICE DAILY   Rx By: OrderMyGearblane Roman; Dispense: 30 Days ; #:60 Tablet; Refill: 5; For: Atrial fibrillation; PHYLLIS = N; Verified Transmission to [facility] #[id number]; Last Updated By: System, SureScripts; 8/17/2017 1:15:54 PM   · ECHO LIMITED WITH CONTRAST IF INDICATED; Status:Need Information - Financial  Authorization; Requested for:17Aug2017;    Perform:[facility]; Due:17Aug2018; Last Updated By:Leonard Molina; 8/17/2017 1:43:40 PM;Ordered; For:Atrial fibrillation; Ordered By:Av Brumfield;   · Follow-up visit in 2 months Evaluation and Treatment  Follow-up  Status: Complete   Done: [id number]   Ordered; For: Atrial fibrillation; Ordered By: OrderMyGearblane Roman Performed:  Due: [id number]; Last Updated By: Robert Alfredo; 8/17/2017 1:43:40 PM  Atrial fibrillation, Health Maintenance    · EKG/ECG- POC; Status:Complete;   Done: [id number]   Perform: In Office; Due:[id number]; Last Updated Geovanna Duque; 8/17/2017 12:56:54 PM;Ordered; For:Atrial fibrillation, Health Maintenance; Ordered By:Rick Brumfield;    Discussion/Summary  Cardiology Discussion Summary Free Text Note Form [facility]:   1  Afib   2  LVEF 50%  3  Mild RV dilatation    Rec: start metoprolol 25mg BID  plan rate control strategy for now  we discussed MIKE/CV but he would like to see how he feels in a couple mos  I have ordered a limited echo in 6 weeks if LV any lower would again consider CV  Continue Eliquis  Chief Complaint  Chief Complaint Free Text Note Form: Patient here today for Hospital f/u  Patient has NO Cardiac Complaints  EKG today  History of Present Illness  Cardiology HPI Free Text Note Form [facility]: Pt seen post hospital followup for Afib  He has been feeling much better without any symptoms   He was started on Eliquis and metoprolol but tells me he never got a script for the bbl  Holter showed AVG   Activity level has increased  Denies CP,SOB, Palps, PND, orthopnea, or syncope  There is no lower extremity edema  Tolerating anticoagulation well  Review of Systems  Cardiology Male ROS:     Cardiac: No complaints of chest pain, no palpitations, no fainiting  Skin: No complaints of nonhealing sores or skin rash  Genitourinary: No complaints of recurrent urinary tract infections, frequent urination at night, difficult urination, blood in urine, kidney stones, loss of bladder control, no kidney or prostate problems, no erectile dysfunction  Psychological: No complaints of feeling depressed, anxiety, panic attacks, or difficulty concentrating  General: No complaints of trouble sleeping, lack of energy, fatigue, appetite changes, weight changes, fever, frequent infections, or night sweats  Respiratory: No complaints of shortness of breath, cough with sputum, or wheezing  HEENT: No complaints of serious problems, hearing problems, nose problems, throat problems, or snoring  Gastrointestinal: No complaints of liver problems, nausea, vomiting, heartburn, constipation, bloody stools, diarrhea, problems swallowing, adbominal pain, or rectal bleeding  Hematologic: No complaints of bleeding disorders, anemia, blood clots, or excessive brusing  Neurological: No complaints of numbness, tingling, dizziness, weakness, seizures, headaches, syncope or fainting, AM fatigue, daytime sleepiness, no witnessed apnea episodes  Musculoskeletal: No complaints of arthritis, back pain, or painfull swelling  Active Problems  Problems    1  Bilateral carpal tunnel syndrome (354 0) (G56 03)   2  Carpal tunnel syndrome of right wrist (354 0) (G56 01)   3  Cervical spondylosis (721 0) (M47 812)   4  Diverticulosis (562 10) (K57 90)   5  Osteoarthritis of left knee (715 96) (M17 12)   6  Pre-operative examination (V72 84) (F94 109)    Past Medical History  Problems    1  History of Diverticulitis (562 11) (K59 92)  Active Problems And Past Medical History Reviewed: The active problems and past medical history were reviewed and updated today  Surgical History  Problems    1  History of Inguinal Hernia Repair   2  History of Partial Colectomy   3  History of Total Knee Replacement Right   4  History of Umbilical Hernia Repair  Surgical History Reviewed: The surgical history was reviewed and updated today  Family History  Father    1  Family history of malignant neoplasm (V16 9) (Z80 9)  Family History Reviewed: The family history was reviewed and updated today  Social History  Problems    · Never a smoker   · Occasional alcohol use  Social History Reviewed: The social history was reviewed and updated today  Current Meds   1  Eliquis 5 MG Oral Tablet; Take 1 tablet twice daily; Therapy: (Recorded:17Aug2017) to Recorded   2  Metoprolol Tartrate 25 MG Oral Tablet; TAKE 1 TABLET TWICE DAILY; Therapy: (Recorded:17Aug2017) to Recorded    Allergies  Medication    1  No Known Drug Allergies    Vitals  Vital Signs    Recorded: 17Aug2017 12:49PM   Heart Rate 98, Apical   Systolic 785, LUE, Sitting   Diastolic 62, LUE, Sitting   Height 5 ft 7 in   Weight 196 lb 5 oz   BMI Calculated 30 75   BSA Calculated 2 01     Physical Exam    Constitutional   General appearance: No acute distress, well appearing and well nourished  Eyes   Conjunctiva and Sclera examination: Conjunctiva pink, sclera anicteric  Ears, Nose, Mouth, and Throat - Oropharynx: Clear, nares are clear, mucous membranes are moist    Neck   Neck and thyroid: Normal, supple, trachea midline, no thyromegaly  Pulmonary   Respiratory effort: No increased work of breathing or signs of respiratory distress  Auscultation of lungs: Clear to auscultation, no rales, no rhonchi, no wheezing, good air movement      Cardiovascular   Auscultation of heart: Abnormal   The heart rate was normal  The rhythm was irregularly irregular  no murmurs were heard  Carotid pulses: Normal, 2+ bilaterally  Peripheral vascular exam: Normal pulses throughout, no tenderness, erythema or swelling  Pedal pulses: Normal, 2+ bilaterally  Examination of extremities for edema and/or varicosities: Normal     Abdomen   Abdomen: Non-tender and no distention  Liver and spleen: No hepatomegaly or splenomegaly  Musculoskeletal Gait and station: Normal gait  Digits and nails: Normal without clubbing or cyanosis  Inspection/palpation of joints, bones, and muscles: Normal, ROM normal     Skin - Skin and subcutaneous tissue: Normal without rashes or lesions  Skin is warm and well perfused, normal turgor  Neurologic - Cranial nerves: II - XII intact   Speech: Normal     Psychiatric - Orientation to person, place, and time: Normal  Mood and affect: Normal       Results/Data  ECG Report: afib non St/T changes      Future Appointments    Date/Time Provider Specialty Site   10/30/2017 01:40 PM Ngoc Roy DO Cardiology [address]   08/21/2017 03:20 PM Brenda Peguero DO Orthopedic Surgery [facility] [address]     Signatures   Electronically signed by : Dai Johnson DO; Aug 18 2017  9:39AM EST                       (Author)

## 2018-01-22 VITALS
HEIGHT: 67 IN | BODY MASS INDEX: 30.81 KG/M2 | HEART RATE: 98 BPM | SYSTOLIC BLOOD PRESSURE: 110 MMHG | WEIGHT: 196.31 LBS | DIASTOLIC BLOOD PRESSURE: 62 MMHG

## 2018-03-05 RX ORDER — METOPROLOL TARTRATE 50 MG/1
50 TABLET, FILM COATED ORAL 2 TIMES DAILY
Refills: 5 | COMMUNITY
Start: 2018-01-12 | End: 2018-06-02 | Stop reason: SDUPTHER

## 2018-04-04 ENCOUNTER — OFFICE VISIT (OUTPATIENT)
Dept: CARDIOLOGY CLINIC | Facility: CLINIC | Age: 78
End: 2018-04-04
Payer: COMMERCIAL

## 2018-04-04 VITALS
HEART RATE: 93 BPM | WEIGHT: 204.5 LBS | OXYGEN SATURATION: 97 % | HEIGHT: 67 IN | DIASTOLIC BLOOD PRESSURE: 70 MMHG | SYSTOLIC BLOOD PRESSURE: 120 MMHG | BODY MASS INDEX: 32.1 KG/M2

## 2018-04-04 DIAGNOSIS — I48.91 ATRIAL FIBRILLATION, UNSPECIFIED TYPE (HCC): Primary | ICD-10-CM

## 2018-04-04 PROCEDURE — 93000 ELECTROCARDIOGRAM COMPLETE: CPT | Performed by: INTERNAL MEDICINE

## 2018-04-04 PROCEDURE — 99214 OFFICE O/P EST MOD 30 MIN: CPT | Performed by: INTERNAL MEDICINE

## 2018-04-04 NOTE — PROGRESS NOTES
Cardiology Follow Up    Yeimy Lambert  1940  [id number]  [address] [facility] [place] [id number]    1  Atrial fibrillation, unspecified type ([facility] 75 )  POCT ECG    Holter monitor - 24 hour    Echo limited with contrast if indicated       Discussion/Summary:  Overall he has been doing well and has no symptoms  Atrial fibrillation has been rate controlled  I have ordered a Holter monitor this year to evaluate the adequacy of his rate controlling agents  Will continue with a rate control strategy for now  Continue anticoagulation  Follow-up limited echo in August to check left ventricular ejection fraction  If any drop in ejection fraction would consider cardioversion at that time and restoration of sinus rhythm  Otherwise he is asymptomatic and doing well  Blood pressure is well controlled  Lipids have been doing well  Interval History:  Routine follow-up visit  He has atrial fibrillation that has been persistent  We have talked about cardioversion in the past he would like to continue with a rate control strategy  Good functional capacity for age with no symptoms  Denies any chest pain, palpitations, shortness of breath, lightheadedness, dizziness, or syncope  Follows a low-sodium diet  Overall doing well  Problem List     Carpal tunnel syndrome of right wrist    Atrial fibrillation (HCC)        Past Medical History:   Diagnosis Date    A-fib ([place] [address] )     Diverticulitis     Hypertension      Social History     Social History    Marital status: /Civil Union     Spouse name: N/A    Number of children: N/A    Years of education: N/A     Occupational History    Not on file       Social History Main Topics    Smoking status: Never Smoker    Smokeless tobacco: Never Used    Alcohol use 1 2 oz/week     2 Cans of beer per week    Drug use: No    Sexual activity: Not on file Other Topics Concern    Not on file     Social History Narrative    No narrative on file      Family History   Problem Relation Age of Onset    Hypertension Mother     Hypertension Father     Cancer Father      Past Surgical History:   Procedure Laterality Date    COLON SURGERY      HERNIA REPAIR      MA REVISE MEDIAN N/CARPAL TUNNEL SURG Right 10/3/2017    Procedure: CARPAL TUNNEL RELEASE;  Surgeon: Brigid Farr DO;  [facility];  Service: Orthopedics    REPLACEMENT TOTAL KNEE         Current Outpatient Prescriptions:     apixaban (ELIQUIS) 5 mg, Take 1 tablet by mouth 2 (two) times a day, Disp: 60 tablet, Rfl: 0    metoprolol tartrate (LOPRESSOR) 50 mg tablet, Take 50 mg by mouth 2 (two) times a day, Disp: , Rfl: 5    Multiple Vitamin (VITAMIN E/FOLIC FQMP/I-0/O-74 PO), Take by mouth, Disp: , Rfl:   No Known Allergies    Labs:     Chemistry        Component Value Date/Time     08/09/2017 0434    K 4 6 08/09/2017 0434     08/09/2017 0434    CO2 29 08/09/2017 0434    BUN 16 08/09/2017 0434    CREATININE 0 88 08/09/2017 0434        Component Value Date/Time    CALCIUM 9 0 08/09/2017 0434            Lab Results   Component Value Date    CHOL 138 08/09/2017     Lab Results   Component Value Date    HDL 43 08/09/2017     Lab Results   Component Value Date    LDLCALC 74 08/09/2017     Lab Results   Component Value Date    TRIG 107 08/09/2017     No components found for: CHOLHDL    Imaging: No results found  ECG:  Atrial fibrillation with nonspecific ST changes      ROS    Vitals:    04/04/18 1014   BP: 120/70   Pulse: 93   SpO2: 97%     Vitals:    04/04/18 1014   Weight: 92 8 kg (204 lb 8 oz)     Height: 5' 7" (170 2 cm)   Body mass index is 32 03 kg/m²  Physical Exam:  Vital signs reviewed    General appearance:  Appears stated age, alert, well appearing and in no distress  HEENT:  PERRLA, EOMI, no scleral icterus, no conjunctival pallor  NECK:  Supple, No elevated JVP, no thyromegaly, no carotid bruits  HEART:  Irregular rate and rhythm, normal S1/S2, no S3/S4, no murmur or rub  LUNGS:  Clear to auscultation bilaterally, no wheezes rales or rhonchi  ABDOMEN:  Soft, non-tender, positive bowel sounds, no rebound or guarding, no organomegaly   EXTREMITIES:  No edema, normal range of motion  VASCULAR:  Normal pedal pulses, good pulse volume   SKIN: No lesions or rashes on exposed skin  NEURO:  CN II-XII intact, no focal deficits

## 2018-04-19 ENCOUNTER — HOSPITAL ENCOUNTER (OUTPATIENT)
Dept: NON INVASIVE DIAGNOSTICS | Facility: CLINIC | Age: 78
Discharge: HOME | End: 2018-04-19
Payer: COMMERCIAL

## 2018-04-19 DIAGNOSIS — I48.91 ATRIAL FIBRILLATION, UNSPECIFIED TYPE (HCC): ICD-10-CM

## 2018-04-19 PROCEDURE — 93226 XTRNL ECG REC<48 HR SCAN A/R: CPT

## 2018-04-19 PROCEDURE — 93225 XTRNL ECG REC<48 HRS REC: CPT

## 2018-04-23 PROCEDURE — 93227 XTRNL ECG REC<48 HR R&I: CPT | Performed by: INTERNAL MEDICINE

## 2018-05-22 ENCOUNTER — TELEPHONE (OUTPATIENT)
Dept: CARDIOLOGY CLINIC | Facility: CLINIC | Age: 78
End: 2018-05-22

## 2018-05-22 NOTE — TELEPHONE ENCOUNTER
Needs to be seen by ENT to see if there is something to cauterize  Can hold eliquis for 2-3 days if bleeding

## 2018-05-22 NOTE — TELEPHONE ENCOUNTER
Foreign Alvarez called c/o severe nosebleeds  Said blood was pouring from his nose  First started on Friday, got it to stop on his own  Bleeding continued on Saturday, which also eventually stopped  Pt decided on his own to stop Eliquis for 2 days, Sunday and Monday  Did not get another nose bleed so he resumed eliquis today  Takes 5 mg bid  He is worried about bleeding starting again    Please advise

## 2018-06-02 DIAGNOSIS — I10 ESSENTIAL HYPERTENSION: Primary | ICD-10-CM

## 2018-06-02 RX ORDER — METOPROLOL TARTRATE 50 MG/1
TABLET, FILM COATED ORAL
Qty: 60 TABLET | Refills: 5 | Status: SHIPPED | OUTPATIENT
Start: 2018-06-02 | End: 2018-11-20 | Stop reason: SDUPTHER

## 2018-08-03 DIAGNOSIS — I48.0 PAROXYSMAL ATRIAL FIBRILLATION (HCC): Primary | ICD-10-CM

## 2018-08-03 RX ORDER — APIXABAN 5 MG/1
TABLET, FILM COATED ORAL
Qty: 180 TABLET | Refills: 2 | Status: SHIPPED | OUTPATIENT
Start: 2018-08-03 | End: 2019-08-27 | Stop reason: SDUPTHER

## 2018-08-15 ENCOUNTER — HOSPITAL ENCOUNTER (OUTPATIENT)
Dept: NON INVASIVE DIAGNOSTICS | Facility: CLINIC | Age: 78
Discharge: HOME | End: 2018-08-15
Payer: COMMERCIAL

## 2018-08-15 DIAGNOSIS — I48.91 ATRIAL FIBRILLATION, UNSPECIFIED TYPE (HCC): ICD-10-CM

## 2018-08-15 PROCEDURE — 93308 TTE F-UP OR LMTD: CPT | Performed by: INTERNAL MEDICINE

## 2018-08-15 PROCEDURE — 93308 TTE F-UP OR LMTD: CPT

## 2018-08-15 PROCEDURE — 93325 DOPPLER ECHO COLOR FLOW MAPG: CPT | Performed by: INTERNAL MEDICINE

## 2018-08-15 PROCEDURE — 93321 DOPPLER ECHO F-UP/LMTD STD: CPT | Performed by: INTERNAL MEDICINE

## 2018-11-20 DIAGNOSIS — I10 ESSENTIAL HYPERTENSION: ICD-10-CM

## 2018-11-21 RX ORDER — METOPROLOL TARTRATE 50 MG/1
TABLET, FILM COATED ORAL
Qty: 60 TABLET | Refills: 2 | Status: SHIPPED | OUTPATIENT
Start: 2018-11-21 | End: 2019-03-04 | Stop reason: SDUPTHER

## 2018-12-14 ENCOUNTER — OFFICE VISIT (OUTPATIENT)
Dept: CARDIOLOGY CLINIC | Facility: CLINIC | Age: 78
End: 2018-12-14
Payer: COMMERCIAL

## 2018-12-14 VITALS
DIASTOLIC BLOOD PRESSURE: 60 MMHG | SYSTOLIC BLOOD PRESSURE: 116 MMHG | HEIGHT: 67 IN | WEIGHT: 204.5 LBS | HEART RATE: 88 BPM | BODY MASS INDEX: 32.1 KG/M2 | OXYGEN SATURATION: 98 %

## 2018-12-14 DIAGNOSIS — I48.91 ATRIAL FIBRILLATION, UNSPECIFIED TYPE (HCC): Primary | ICD-10-CM

## 2018-12-14 PROCEDURE — 93000 ELECTROCARDIOGRAM COMPLETE: CPT | Performed by: INTERNAL MEDICINE

## 2018-12-14 PROCEDURE — 99214 OFFICE O/P EST MOD 30 MIN: CPT | Performed by: INTERNAL MEDICINE

## 2018-12-14 RX ORDER — MULTIVITAMIN WITH IRON
100 TABLET ORAL DAILY
COMMUNITY
End: 2021-08-18 | Stop reason: HOSPADM

## 2018-12-14 NOTE — PROGRESS NOTES
Cardiology Follow Up    Elva Chung  1940  [id number]  [facility] [address] [address] [facility] [address] [id number]    1  Atrial fibrillation, unspecified type (HCC)  POCT ECG    Comprehensive metabolic panel    CBC and Platelet    Lipid panel       Discussion/Summary:  Overall things have been doing well  Atrial fibrillation is rate controlled he is tolerating anticoagulation  Recent echocardiogram was reviewed  Blood pressures been well controlled  He is due for some basic blood work including a CBC, CMP, fasting lipid profile  I will continue to monitors cardiac risk factors see him back in 1 year  Talked about diet and exercise  Interval History:  Routine follow-up visit  He has atrial fibrillation that has been persistent  We have talked about cardioversion in the past he would like to continue with a rate control strategy  Overall he is doing well  Denies any chest pain, shortness of breath, palpitations  Functional capacity is quite good with no symptoms  There has been no lower extremity edema, PND, orthopnea  He is tolerating anticoagulation with no significant adverse bleeding events  He had 1 bloody nose but this was self-limiting  Problem List     Carpal tunnel syndrome of right wrist    Atrial fibrillation (HCC)        Past Medical History:   Diagnosis Date    A-fib ([facility] )     Diverticulitis     Hypertension      Social History     Social History    Marital status: /Civil Union     Spouse name: N/A    Number of children: N/A    Years of education: N/A     Occupational History    Not on file       Social History Main Topics    Smoking status: Never Smoker    Smokeless tobacco: Never Used    Alcohol use 1 2 oz/week     2 Cans of beer per week    Drug use: No    Sexual activity: Not on file     Other Topics Concern    Not on file     Social History Narrative    No narrative on file      Family History   Problem Relation Age of Onset    Hypertension Mother     Hypertension Father     Cancer Father     Heart attack Neg Hx     Stroke Neg Hx     Anuerysm Neg Hx     Clotting disorder Neg Hx     Arrhythmia Neg Hx     Heart failure Neg Hx     Coronary artery disease Neg Hx     Sudden death Neg Hx         scd    Hyperlipidemia Neg Hx      Past Surgical History:   Procedure Laterality Date    COLON SURGERY      HERNIA REPAIR      DC REVISE MEDIAN N/CARPAL TUNNEL SURG Right 10/3/2017    Procedure: CARPAL TUNNEL RELEASE;  Surgeon: Jeanne Rosas DO;  [facility];  Service: Orthopedics    REPLACEMENT TOTAL KNEE         Current Outpatient Prescriptions:     ELIQUIS 5 MG, TAKE 1 TABLET TWICE DAILY, Disp: 180 tablet, Rfl: 2    metoprolol tartrate (LOPRESSOR) 50 mg tablet, TAKE 1 TABLET TWICE DAILY  , Disp: 60 tablet, Rfl: 2    Multiple Vitamin (VITAMIN E/FOLIC ZORQ/X-4/W-67 PO), Take by mouth daily  , Disp: , Rfl:     pyridoxine (VITAMIN B6) 100 mg tablet, Take 100 mg by mouth daily, Disp: , Rfl:   No Known Allergies    Labs:     Chemistry        Component Value Date/Time    K 4 6 08/09/2017 0434     08/09/2017 0434    CO2 29 08/09/2017 0434    BUN 16 08/09/2017 0434    CREATININE 0 88 08/09/2017 0434        Component Value Date/Time    CALCIUM 9 0 08/09/2017 0434            No results found for: CHOL  Lab Results   Component Value Date    HDL 43 08/09/2017     Lab Results   Component Value Date    LDLCALC 74 08/09/2017     Lab Results   Component Value Date    TRIG 107 08/09/2017     No results found for: CHOLHDL    Imaging: No results found  ECG:  Atrial fibrillation with nonspecific ST changes      Review of Systems   Constitution: Negative  HENT: Negative  Eyes: Negative  Cardiovascular: Negative  Respiratory: Negative  Endocrine: Negative  Hematologic/Lymphatic: Negative  Skin: Negative  Musculoskeletal: Negative  Gastrointestinal: Negative  Genitourinary: Negative  Neurological: Negative  Psychiatric/Behavioral: Negative  Vitals:    12/14/18 1421   BP: 116/60   Pulse: 88   SpO2: 98%     Vitals:    12/14/18 1421   Weight: 92 8 kg (204 lb 8 oz)     Height: 5' 7" (170 2 cm)   Body mass index is 32 03 kg/m²  Physical Exam:  Vital signs reviewed    General appearance:  Appears stated age, alert, well appearing and in no distress  HEENT:  PERRLA, EOMI, no scleral icterus, no conjunctival pallor  NECK:  Supple, No elevated JVP, no thyromegaly, no carotid bruits  HEART:  Irregular rate and rhythm, normal S1/S2, no S3/S4, no murmur or rub  LUNGS:  Clear to auscultation bilaterally, no wheezes rales or rhonchi  ABDOMEN:  Soft, non-tender, positive bowel sounds, no rebound or guarding, no organomegaly   EXTREMITIES:  No edema, normal range of motion  VASCULAR:  Normal pedal pulses, good pulse volume   SKIN: No lesions or rashes on exposed skin  NEURO:  CN II-XII intact, no focal deficits

## 2019-03-04 DIAGNOSIS — I10 ESSENTIAL HYPERTENSION: ICD-10-CM

## 2019-03-04 RX ORDER — METOPROLOL TARTRATE 50 MG/1
TABLET, FILM COATED ORAL
Qty: 60 TABLET | Refills: 2 | Status: SHIPPED | OUTPATIENT
Start: 2019-03-04 | End: 2019-06-26 | Stop reason: SDUPTHER

## 2019-06-26 DIAGNOSIS — I10 ESSENTIAL HYPERTENSION: ICD-10-CM

## 2019-06-26 RX ORDER — METOPROLOL TARTRATE 50 MG/1
TABLET, FILM COATED ORAL
Qty: 60 TABLET | Refills: 2 | Status: SHIPPED | OUTPATIENT
Start: 2019-06-26 | End: 2019-09-26 | Stop reason: SDUPTHER

## 2019-08-27 DIAGNOSIS — I48.0 PAROXYSMAL ATRIAL FIBRILLATION (HCC): ICD-10-CM

## 2019-08-27 RX ORDER — APIXABAN 5 MG/1
TABLET, FILM COATED ORAL
Qty: 60 TABLET | Refills: 8 | Status: SHIPPED | OUTPATIENT
Start: 2019-08-27 | End: 2020-09-11

## 2019-09-26 DIAGNOSIS — I10 ESSENTIAL HYPERTENSION: ICD-10-CM

## 2019-09-26 RX ORDER — METOPROLOL TARTRATE 50 MG/1
TABLET, FILM COATED ORAL
Qty: 60 TABLET | Refills: 2 | Status: SHIPPED | OUTPATIENT
Start: 2019-09-26 | End: 2019-10-24 | Stop reason: SDUPTHER

## 2019-10-24 DIAGNOSIS — I10 ESSENTIAL HYPERTENSION: ICD-10-CM

## 2019-10-24 RX ORDER — METOPROLOL TARTRATE 50 MG/1
TABLET, FILM COATED ORAL
Qty: 60 TABLET | Refills: 2 | Status: SHIPPED | OUTPATIENT
Start: 2019-10-24 | End: 2020-02-02

## 2019-11-07 ENCOUNTER — HOSPITAL ENCOUNTER (EMERGENCY)
Facility: HOSPITAL | Age: 79
Discharge: HOME | End: 2019-11-07
Attending: EMERGENCY MEDICINE | Admitting: EMERGENCY MEDICINE
Payer: COMMERCIAL

## 2019-11-07 VITALS
HEART RATE: 62 BPM | OXYGEN SATURATION: 98 % | TEMPERATURE: 97.5 F | RESPIRATION RATE: 18 BRPM | DIASTOLIC BLOOD PRESSURE: 101 MMHG | SYSTOLIC BLOOD PRESSURE: 171 MMHG

## 2019-11-07 DIAGNOSIS — R04.0 ACUTE ANTERIOR EPISTAXIS: Primary | ICD-10-CM

## 2019-11-07 PROCEDURE — 99283 EMERGENCY DEPT VISIT LOW MDM: CPT

## 2019-11-07 PROCEDURE — 99282 EMERGENCY DEPT VISIT SF MDM: CPT | Performed by: EMERGENCY MEDICINE

## 2019-11-07 PROCEDURE — 30901 CONTROL OF NOSEBLEED: CPT | Performed by: EMERGENCY MEDICINE

## 2019-11-07 RX ORDER — OXYMETAZOLINE HYDROCHLORIDE 0.05 G/100ML
2 SPRAY NASAL ONCE
Status: COMPLETED | OUTPATIENT
Start: 2019-11-07 | End: 2019-11-07

## 2019-11-07 RX ORDER — GINSENG 100 MG
1 CAPSULE ORAL ONCE
Status: DISCONTINUED | OUTPATIENT
Start: 2019-11-07 | End: 2019-11-07

## 2019-11-07 RX ORDER — OXYMETAZOLINE HYDROCHLORIDE 0.05 G/100ML
2 SPRAY NASAL ONCE
Status: DISCONTINUED | OUTPATIENT
Start: 2019-11-07 | End: 2019-11-07 | Stop reason: SDUPTHER

## 2019-11-07 RX ORDER — BACITRACIN, NEOMYCIN, POLYMYXIN B 400; 3.5; 5 [USP'U]/G; MG/G; [USP'U]/G
1 OINTMENT TOPICAL ONCE
Status: DISCONTINUED | OUTPATIENT
Start: 2019-11-07 | End: 2019-11-07

## 2019-11-07 RX ADMIN — OXYMETAZOLINE HCL 2 SPRAY: 0.05 SPRAY NASAL at 07:27

## 2019-11-07 NOTE — ED PROVIDER NOTES
History  Chief Complaint   Patient presents with    Nose Bleed     pt reports nose bleed started at 06:30 this morning, takes eliquis for afib  similar episode one other time but was able to treat at home  History provided by:  Patient and spouse  Nose Bleed   Location:  R nare  Severity:  Severe  Duration:  1 hour  Timing:  Constant  Progression:  Unchanged  Chronicity:  New  Context: anticoagulants (Eliquis)    Relieved by:  Applying pressure  Worsened by:  Nothing  Associated symptoms: congestion    Associated symptoms: no blood in oropharynx, no cough, no dizziness, no facial pain, no fever, no headaches, no sore throat and no syncope        Prior to Admission Medications   Prescriptions Last Dose Informant Patient Reported? Taking? ELIQUIS 5 MG   No Yes   Sig: TAKE 1 TABLET TWICE DAILY   Multiple Vitamin (VITAMIN E/FOLIC YAXR/L-9/U-51 PO)  Self Yes No   Sig: Take by mouth daily     metoprolol tartrate (LOPRESSOR) 50 mg tablet   No No   Sig: TAKE 1 TABLET TWICE DAILY     pyridoxine (VITAMIN B6) 100 mg tablet  Self Yes No   Sig: Take 100 mg by mouth daily      Facility-Administered Medications: None       Past Medical History:   Diagnosis Date    A-fib ([facility] [address] )     Diverticulitis     Hypertension        Past Surgical History:   Procedure Laterality Date    COLON SURGERY      HERNIA REPAIR      AR REVISE MEDIAN N/CARPAL TUNNEL SURG Right 10/3/2017    Procedure: CARPAL TUNNEL RELEASE;  Surgeon: Saritha Reyes DO;  [facility];  Service: Orthopedics    REPLACEMENT TOTAL KNEE         Family History   Problem Relation Age of Onset    Hypertension Mother     Hypertension Father     Cancer Father     Heart attack Neg Hx     Stroke Neg Hx     Anuerysm Neg Hx     Clotting disorder Neg Hx     Arrhythmia Neg Hx     Heart failure Neg Hx     Coronary artery disease Neg Hx     Sudden death Neg Hx         scd    Hyperlipidemia Neg Hx      I have reviewed and agree with the history as documented  Social History     Tobacco Use    Smoking status: Never Smoker    Smokeless tobacco: Never Used   Substance Use Topics    Alcohol use: Yes     Alcohol/week: 2 0 standard drinks     Types: 2 Cans of beer per week    Drug use: No        Review of Systems   Constitutional: Negative for activity change, chills, diaphoresis and fever  HENT: Positive for congestion and nosebleeds  Negative for sinus pressure and sore throat  Eyes: Negative for pain and visual disturbance  Respiratory: Negative for cough, chest tightness, shortness of breath, wheezing and stridor  Cardiovascular: Negative for chest pain, palpitations and syncope  Gastrointestinal: Negative for abdominal distention, abdominal pain, constipation, diarrhea, nausea and vomiting  Genitourinary: Negative for dysuria and frequency  Musculoskeletal: Negative for neck pain and neck stiffness  Skin: Negative for rash  Neurological: Negative for dizziness, speech difficulty, light-headedness, numbness and headaches  Physical Exam  Physical Exam   Constitutional: He appears well-developed  HENT:   Head: Atraumatic  Large amount of large blood clots expressed from right nares, area of bleeding, along the septum, right on the border between anterior posterior, with spraying Afrin holding direct pressure, bleeding was able to be controlled   Eyes: Conjunctivae are normal  Right eye exhibits no discharge  Left eye exhibits no discharge  No scleral icterus  Neck: Neck supple  No tracheal deviation present  Pulmonary/Chest: Effort normal  No stridor  No respiratory distress  Musculoskeletal: He exhibits no deformity  Neurological: He is alert  He exhibits normal muscle tone  Coordination normal    Skin: Skin is warm and dry  No rash noted  No erythema  No pallor  Psychiatric: He has a normal mood and affect  Vitals reviewed        Vital Signs  ED Triage Vitals   Temperature Pulse Respirations Blood Pressure SpO2 11/07/19 0720 11/07/19 0717 11/07/19 0717 11/07/19 0717 11/07/19 0717   97 5 °F (36 4 °C) 62 18 (!) 171/101 98 %      Temp Source Heart Rate Source Patient Position - Orthostatic VS BP Location FiO2 (%)   11/07/19 0720 11/07/19 0717 11/07/19 0717 11/07/19 0717 --   Oral Monitor Sitting Right arm       Pain Score       --                  Vitals:    11/07/19 0717   BP: (!) 171/101   Pulse: 62   Patient Position - Orthostatic VS: Sitting         Visual Acuity      ED Medications  Medications   bacitracin topical ointment 1 small application (has no administration in time range)   neomycin-bacitracin-polymyxin b (NEOSPORIN) ointment 1 small application (has no administration in time range)   oxymetazoline (AFRIN) 0 05 % nasal spray 2 spray (2 sprays Each Nare Given by Other 11/7/19 0519)       Diagnostic Studies  Results Reviewed     None                 No orders to display              Procedures  Epistaxis management  Date/Time: 11/7/2019 7:30 AM  Performed by: Gray Overton DO  Authorized by: Gray Overton DO     Patient location:  ED  Consent:     Consent obtained:  Verbal    Consent given by:  Patient    Risks discussed:  Bleeding    Alternatives discussed:  No treatment  Universal protocol:     Patient identity confirmed:  Verbally with patient  Anesthesia (see MAR for exact dosages): Anesthesia method:  None  Procedure details:     Treatment site:  R anterior, R septum and R posterior    Hemostasis method:  Other (comment) (Direct pressure and Afrin soaked cotton ball)    Approach:  External    Treatment complexity:  Limited    Treatment episode: initial    Post-procedure details:     Assessment:  Bleeding stopped    Patient tolerance of procedure:   Tolerated well, no immediate complications           ED Course  ED Course as of Nov 07 0752   Thu Nov 07, 2019   5479 No further return of epistaxis, will discharge                                  MDM  Number of Diagnoses or Management Options  Acute anterior epistaxis: new and requires workup  Diagnosis management comments: 70-year-old male epistaxis, stopped after blowing out blood clots and spraying nose Afrin monitor emergency department, no further bleeding  , discussed cauterization, as it is at the anterior/posterior border, appear tender cauterization at this time will actually worsen the problem  , patient agrees to return for recurrent bleeding       Amount and/or Complexity of Data Reviewed  Decide to obtain previous medical records or to obtain history from someone other than the patient: yes  Obtain history from someone other than the patient: yes  Review and summarize past medical records: yes        Disposition  Final diagnoses:   Acute anterior epistaxis     Time reflects when diagnosis was documented in both MDM as applicable and the Disposition within this note     Time User Action Codes Description Comment    11/7/2019  7:51 AM Chantel Ear Add [R04 0] Acute anterior epistaxis       ED Disposition     ED Disposition Condition Date/Time Comment    Discharge Stable Thu Nov 7, 2019  7:51 AM Gisell Davidson discharge to home/self care  Follow-up Information     Follow up With Specialties Details Why Contact Info Additional [address] Emergency Department Emergency Medicine Go to  If symptoms worsen [address] [facility] [address] [facility], [address], [place], [place], [address]          Patient's Medications   Discharge Prescriptions    No medications on file     No discharge procedures on file      ED Provider  Electronically Signed by           eMl Jett DO  11/07/19 7777

## 2020-02-02 DIAGNOSIS — I10 ESSENTIAL HYPERTENSION: ICD-10-CM

## 2020-02-02 RX ORDER — METOPROLOL TARTRATE 50 MG/1
TABLET, FILM COATED ORAL
Qty: 180 TABLET | Refills: 0 | Status: SHIPPED | OUTPATIENT
Start: 2020-02-02 | End: 2020-07-30

## 2020-02-28 ENCOUNTER — OFFICE VISIT (OUTPATIENT)
Dept: CARDIOLOGY CLINIC | Facility: CLINIC | Age: 80
End: 2020-02-28
Payer: COMMERCIAL

## 2020-02-28 VITALS
HEART RATE: 115 BPM | BODY MASS INDEX: 30.95 KG/M2 | DIASTOLIC BLOOD PRESSURE: 70 MMHG | HEIGHT: 67 IN | WEIGHT: 197.2 LBS | SYSTOLIC BLOOD PRESSURE: 108 MMHG | OXYGEN SATURATION: 92 %

## 2020-02-28 DIAGNOSIS — I48.0 PAROXYSMAL ATRIAL FIBRILLATION (HCC): Primary | ICD-10-CM

## 2020-02-28 DIAGNOSIS — R04.0 EPISTAXIS, RECURRENT: ICD-10-CM

## 2020-02-28 PROCEDURE — 93000 ELECTROCARDIOGRAM COMPLETE: CPT | Performed by: INTERNAL MEDICINE

## 2020-02-28 PROCEDURE — 99214 OFFICE O/P EST MOD 30 MIN: CPT | Performed by: INTERNAL MEDICINE

## 2020-02-28 NOTE — PROGRESS NOTES
Cardiology Follow Up    Kezia Thomas  1940  [id number]  [facility] [place] [facility] [address] [address]    1  Paroxysmal atrial fibrillation (HCC)  POCT ECG    Holter monitor - 24 hour    Echo complete with contrast if indicated   2  Epistaxis, recurrent         Discussion/Summary:  Patient returns to the office today for discussions on atrial fibrillation  He has had several episodes of nose bleeds and other skin bleeds and took it upon himself to reduce his Eliquis to 5 mg once a day  He has concerns about being on long-term anticoagulation  I had a long discussion with him today regarding Watchman device  He will think about this and call me in 1-2 weeks to discuss  In the meantime heart rate was mildly elevated on ECG I have recommended a Holter monitor, he has been well controlled on his metoprolol in the past   I have also ordered an echocardiogram to make sure there is no change in LV function  Blood pressure has been well controlled  Lipids are due to be checked       Interval History:  Routine follow-up visit  He has atrial fibrillation that has been persistent  We have talked about cardioversion in the past he would like to continue with a rate control strategy  Presents today for routine scheduled yearly follow-up visit  He has had some episodes of frequent bleeding with his atrial fibrillation  He tells me he was in the emergency department for significant nose bleed  He has some episodes of bleeding at night mostly sound like skin tears  From an exertional standpoint he has been doing good his functional capacity is great  Denies any exertional chest pain, shortness of breath, lightheadedness, dizziness, or syncope  His been no lower extremity edema, PND, orthopnea      Problem List     Carpal tunnel syndrome of right wrist    Atrial fibrillation [facility])        Past Medical History:   Diagnosis Date    [facility]fib ([facility] )     Diverticulitis     Hypertension      Social History     Socioeconomic History    Marital status: /Civil Union     Spouse name: Not on file    Number of children: Not on file    Years of education: Not on file    Highest education level: Not on file   Occupational History    Not on file   Social Needs    Financial resource strain: Not on file    Food insecurity:     Worry: Not on file     Inability: Not on file    Transportation needs:     Medical: Not on file     Non-medical: Not on file   Tobacco Use    Smoking status: Never Smoker    Smokeless tobacco: Never Used   Substance and Sexual Activity    Alcohol use:  Yes     Alcohol/week: 2 0 standard drinks     Types: 2 Cans of beer per week    Drug use: No    Sexual activity: Not on file   Lifestyle    Physical activity:     Days per week: Not on file     Minutes per session: Not on file    Stress: Not on file   Relationships    Social connections:     Talks on phone: Not on file     Gets together: Not on file     Attends Confucianism service: Not on file     Active member of club or organization: Not on file     Attends meetings of clubs or organizations: Not on file     Relationship status: Not on file    Intimate partner violence:     Fear of current or ex partner: Not on file     Emotionally abused: Not on file     Physically abused: Not on file     Forced sexual activity: Not on file   Other Topics Concern    Not on file   Social History Narrative    Not on file      Family History   Problem Relation Age of Onset    Hypertension Mother     Hypertension Father     Cancer Father     Heart attack Neg Hx     Stroke Neg Hx     Anuerysm Neg Hx     Clotting disorder Neg Hx     Arrhythmia Neg Hx     Heart failure Neg Hx     Coronary artery disease Neg Hx     Sudden death Neg Hx         scd    Hyperlipidemia Neg Hx      Past Surgical History:   Procedure Laterality Date    COLON SURGERY  HERNIA REPAIR      PA REVISE MEDIAN N/CARPAL TUNNEL SURG Right 10/3/2017    Procedure: CARPAL TUNNEL RELEASE;  Surgeon: Rafa Clements DO;  [facility];  Service: Orthopedics    REPLACEMENT TOTAL KNEE         Current Outpatient Medications:     ELIQUIS 5 MG, TAKE 1 TABLET TWICE DAILY, Disp: 60 tablet, Rfl: 8    metoprolol tartrate (LOPRESSOR) 50 mg tablet, TAKE 1 TABLET TWICE DAILY  , Disp: 180 tablet, Rfl: 0    Multiple Vitamin (VITAMIN E/FOLIC JSAE/C-1/G-87 PO), Take by mouth daily  , Disp: , Rfl:     pyridoxine (VITAMIN B6) 100 mg tablet, Take 100 mg by mouth daily, Disp: , Rfl:   No Known Allergies    Labs:     Chemistry        Component Value Date/Time    K 4 6 08/09/2017 0434     08/09/2017 0434    CO2 29 08/09/2017 0434    BUN 16 08/09/2017 0434    CREATININE 0 88 08/09/2017 0434        Component Value Date/Time    CALCIUM 9 0 08/09/2017 0434            No results found for: CHOL  Lab Results   Component Value Date    HDL 43 08/09/2017     Lab Results   Component Value Date    LDLCALC 74 08/09/2017     Lab Results   Component Value Date    TRIG 107 08/09/2017     No results found for: CHOLHDL    Imaging: No results found  ECG:  Atrial fibrillation mildly elevated ventricular rate nonspecific ST changes      Review of Systems   Constitution: Negative  HENT: Negative  Eyes: Negative  Cardiovascular: Negative  Respiratory: Negative  Endocrine: Negative  Hematologic/Lymphatic: Negative  Skin: Negative  Musculoskeletal: Negative  Gastrointestinal: Negative  Genitourinary: Negative  Neurological: Negative  Psychiatric/Behavioral: Negative  Vitals:    02/28/20 0910   BP: 108/70   Pulse: (!) 115   SpO2: 92%     Vitals:    02/28/20 0910   Weight: 89 4 kg (197 lb 3 2 oz)     Height: 5' 7" (170 2 cm)   Body mass index is 30 89 kg/m²      Physical Exam:  Vital signs reviewed  General:  Alert and cooperative, appears stated age, no acute distress  HEENT: PERRLA, EOMI, no scleral icterus, no conjunctival pallor  Neck:  No lymphadenopathy, no thyromegaly, no carotid bruits, no elevated JVP  Heart:  irregular rate and rhythm, normal S1/S2, no S3/S4, no murmur, rubs or gallops  PMI nondisplaced  Lungs:  Clear to auscultation bilaterally, no wheezes rales or rhonchi  Abdomen:  Soft, non-tender, positive bowel sounds, no rebound or guarding,   no organomegaly   Extremities:  Normal range of motion    No clubbing, cyanosis or edema   Vascular:  2+ pedal pulses  Skin:  No rashes or lesions on exposed skin  Neurologic:  Cranial nerves II-XII grossly intact without focal deficits

## 2020-06-15 ENCOUNTER — HOSPITAL ENCOUNTER (OUTPATIENT)
Dept: NON INVASIVE DIAGNOSTICS | Facility: CLINIC | Age: 80
Discharge: HOME | End: 2020-06-15
Payer: COMMERCIAL

## 2020-06-15 DIAGNOSIS — I48.0 PAROXYSMAL ATRIAL FIBRILLATION (HCC): ICD-10-CM

## 2020-06-15 PROCEDURE — 93226 XTRNL ECG REC<48 HR SCAN A/R: CPT

## 2020-06-15 PROCEDURE — 93306 TTE W/DOPPLER COMPLETE: CPT | Performed by: INTERNAL MEDICINE

## 2020-06-15 PROCEDURE — 93225 XTRNL ECG REC<48 HRS REC: CPT

## 2020-06-15 PROCEDURE — 93306 TTE W/DOPPLER COMPLETE: CPT

## 2020-06-17 PROCEDURE — 93227 XTRNL ECG REC<48 HR R&I: CPT | Performed by: INTERNAL MEDICINE

## 2020-07-02 ENCOUNTER — OFFICE VISIT (OUTPATIENT)
Dept: CARDIOLOGY CLINIC | Facility: CLINIC | Age: 80
End: 2020-07-02
Payer: COMMERCIAL

## 2020-07-02 VITALS
BODY MASS INDEX: 30.76 KG/M2 | HEART RATE: 76 BPM | SYSTOLIC BLOOD PRESSURE: 114 MMHG | WEIGHT: 196 LBS | DIASTOLIC BLOOD PRESSURE: 70 MMHG | HEIGHT: 67 IN | OXYGEN SATURATION: 97 %

## 2020-07-02 DIAGNOSIS — I48.91 ATRIAL FIBRILLATION, UNSPECIFIED TYPE (HCC): Primary | ICD-10-CM

## 2020-07-02 DIAGNOSIS — R04.0 EPISTAXIS, RECURRENT: ICD-10-CM

## 2020-07-02 PROCEDURE — 99214 OFFICE O/P EST MOD 30 MIN: CPT | Performed by: INTERNAL MEDICINE

## 2020-07-02 PROCEDURE — 93000 ELECTROCARDIOGRAM COMPLETE: CPT | Performed by: INTERNAL MEDICINE

## 2020-07-02 NOTE — PROGRESS NOTES
Cardiology Follow Up    Celio Cobos  1940  [id number]  [facility] [address] [address]    1  Atrial fibrillation, unspecified type (HCC)  POCT ECG    CBC and Platelet    Comprehensive metabolic panel    Lipid Panel with Direct LDL reflex   2  Epistaxis, recurrent  CBC and Platelet    Comprehensive metabolic panel    Lipid Panel with Direct LDL reflex       Discussion/Summary:  Overall he has been doing well since her last visit  His atrial fibrillation has been rate controlled  Recent echocardiogram and Holter monitor are unchanged  Average heart rate less than 100  Ejection fraction 50% and stable  He is only taking the Eliquis once a day secondary to nose bleeds  We have talked about Watchman device but he is not interested at this time  He understands there is an increased risk taking the Eliquis only once a day  I have ordered some basic blood work including a CBC, CMP, lipid lb  Blood pressure well controlled  Continue diet and exercise follow-up on a yearly basis  Interval History:  Routine follow-up visit  He has atrial fibrillation that has been persistent  We have talked about cardioversion in the past he would like to continue with a rate control strategy  Overall he has been doing well since her last follow-up visit  He remains very physically active and continues to work  Denies any exertional symptoms  There has been no chest pain, shortness of breath, palpitations, lightheadedness, dizziness, or syncope  There has been no lower extremity edema, PND, orthopnea  He has been taking all medications as prescribed with the exception of Eliquis which he is only taking once a day  This is secondary to prior post axis      Problem List     Carpal tunnel syndrome of right wrist    Atrial fibrillation [facility])        Past Medical History:   Diagnosis Date    [facility]     Diverticulitis     Hypertension      Social History     Socioeconomic History    Marital status: /Civil Union     Spouse name: Not on file    Number of children: Not on file    Years of education: Not on file    Highest education level: Not on file   Occupational History    Not on file   Social Needs    Financial resource strain: Not on file    Food insecurity:     Worry: Not on file     Inability: Not on file    Transportation needs:     Medical: Not on file     Non-medical: Not on file   Tobacco Use    Smoking status: Never Smoker    Smokeless tobacco: Never Used   Substance and Sexual Activity    Alcohol use:  Yes     Alcohol/week: 2 0 standard drinks     Types: 2 Cans of beer per week    Drug use: No    Sexual activity: Not on file   Lifestyle    Physical activity:     Days per week: Not on file     Minutes per session: Not on file    Stress: Not on file   Relationships    Social connections:     Talks on phone: Not on file     Gets together: Not on file     Attends Druze service: Not on file     Active member of club or organization: Not on file     Attends meetings of clubs or organizations: Not on file     Relationship status: Not on file    Intimate partner violence:     Fear of current or ex partner: Not on file     Emotionally abused: Not on file     Physically abused: Not on file     Forced sexual activity: Not on file   Other Topics Concern    Not on file   Social History Narrative    Not on file      Family History   Problem Relation Age of Onset    Hypertension Mother     Hypertension Father     Cancer Father     Heart attack Neg Hx     Stroke Neg Hx     Anuerysm Neg Hx     Clotting disorder Neg Hx     Arrhythmia Neg Hx     Heart failure Neg Hx     Coronary artery disease Neg Hx     Sudden death Neg Hx         scd    Hyperlipidemia Neg Hx      Past Surgical History:   Procedure Laterality Date    COLON SURGERY      HERNIA REPAIR      PA REVISE MEDIAN N/CARPAL TUNNEL SURG Right 10/3/2017    Procedure: CARPAL TUNNEL RELEASE;  Surgeon: Therese Childs DO;  [facility];  Service: Orthopedics    REPLACEMENT TOTAL KNEE         Current Outpatient Medications:     ELIQUIS 5 MG, TAKE 1 TABLET TWICE DAILY, Disp: 60 tablet, Rfl: 8    metoprolol tartrate (LOPRESSOR) 50 mg tablet, TAKE 1 TABLET TWICE DAILY  , Disp: 180 tablet, Rfl: 0    Multiple Vitamin (VITAMIN E/FOLIC NHOW/T-3/W-01 PO), Take by mouth daily  , Disp: , Rfl:     pyridoxine (VITAMIN B6) 100 mg tablet, Take 100 mg by mouth daily, Disp: , Rfl:   No Known Allergies    Labs:     Chemistry        Component Value Date/Time    K 4 6 08/09/2017 0434     08/09/2017 0434    CO2 29 08/09/2017 0434    BUN 16 08/09/2017 0434    CREATININE 0 88 08/09/2017 0434        Component Value Date/Time    CALCIUM 9 0 08/09/2017 0434            No results found for: CHOL  Lab Results   Component Value Date    HDL 43 08/09/2017     Lab Results   Component Value Date    LDLCALC 74 08/09/2017     Lab Results   Component Value Date    TRIG 107 08/09/2017     No results found for: CHOLHDL    Imaging: No results found  ECG:  AFib nonspecific ST and T changes    Review of Systems   Constitution: Negative  HENT: Negative  Eyes: Negative  Cardiovascular: Negative  Respiratory: Negative  Endocrine: Negative  Hematologic/Lymphatic: Negative  Skin: Negative  Musculoskeletal: Negative  Gastrointestinal: Negative  Genitourinary: Negative  Neurological: Negative  Psychiatric/Behavioral: Negative  All other systems reviewed and are negative  Vitals:    07/02/20 0804   BP: 114/70   Pulse: 76   SpO2: 97%     Vitals:    07/02/20 0804   Weight: 88 9 kg (196 lb)     Height: 5' 7" (170 2 cm)   Body mass index is 30 7 kg/m²      Physical Exam:  Vital signs reviewed  General:  Alert and cooperative, appears stated age, no acute distress  HEENT:  PERRLA, EOMI, no scleral icterus, no conjunctival pallor  Neck:  No lymphadenopathy, no thyromegaly, no carotid bruits, no elevated JVP  Heart:  irregular rate and rhythm, normal S1/S2, no S3/S4, no murmur, rubs or gallops  PMI nondisplaced  Lungs:  Clear to auscultation bilaterally, no wheezes rales or rhonchi  Abdomen:  Soft, non-tender, positive bowel sounds, no rebound or guarding,   no organomegaly   Extremities:  Normal range of motion    No clubbing, cyanosis or edema   Vascular:  2+ pedal pulses  Skin:  No rashes or lesions on exposed skin  Neurologic:  Cranial nerves II-XII grossly intact without focal deficits  Psych:  Normal mood and affect

## 2020-07-30 DIAGNOSIS — I10 ESSENTIAL HYPERTENSION: ICD-10-CM

## 2020-07-30 RX ORDER — METOPROLOL TARTRATE 50 MG/1
TABLET, FILM COATED ORAL
Qty: 180 TABLET | Refills: 0 | Status: SHIPPED | OUTPATIENT
Start: 2020-07-30 | End: 2021-06-10 | Stop reason: SDUPTHER

## 2020-09-10 DIAGNOSIS — I48.0 PAROXYSMAL ATRIAL FIBRILLATION (HCC): ICD-10-CM

## 2020-09-11 RX ORDER — APIXABAN 5 MG/1
TABLET, FILM COATED ORAL
Qty: 60 TABLET | Refills: 8 | Status: SHIPPED | OUTPATIENT
Start: 2020-09-11 | End: 2021-07-09 | Stop reason: SDUPTHER

## 2020-09-24 NOTE — DISCHARGE SUMMARY
Discharge Summary - [facility]    Patient Information: Roly Melgar 78 y o  male MRN: [id number]  Unit/Bed#: -01 Encounter: [id number]    Discharging Physician / Practitioner: Shawanda Duenas MD  PCP: Lisbet Gaming MD  Admission Date: 8/8/2017  Discharge Date: 08/09/17    Reason for Admission: Referred from OR for atrial fibrillation    Discharge Diagnoses:     Principal Problem:    Atrial fibrillation  Active Problems:    Carpal tunnel syndrome of right wrist  Resolved Problems:    * No resolved hospital problems  *      Consultations During Hospital Stay:  · Cardiology    Procedures Performed:     · 2-D echo EF 50%  · Chest x-ray no active lung disease    Hospital Course:     Roly Melgar is a 78 y o  male patient who originally presented to the hospital on 8/8/2017 patient was undergoing an elective carpal tunnel surgery he was noted to have atrial fibrillation and hence the surgery was put on hold and he was sent to the ER for further evaluation  Patient was noted to have atrial fibrillation new onset patient was asymptomatic  He was placed on metoprolol 25 b i d , he was seen in consultation with cardiology and has been placed on eliquis for anticoagulation  He remains hemodynamically stable, asymptomatic and is deemed ready for discharge today    Condition at Discharge: fair     Discharge Day Visit / Exam:     Subjective:      Comfortably lying in bed  Agreeable to discharge planning      Vitals: Blood Pressure: 123/65 (08/09/17 0656)  Pulse: 86 (08/09/17 0656)  Temperature: 98 °F (36 7 °C) (08/09/17 0656)  Temp Source: Oral (08/09/17 0656)  Respirations: 18 (08/09/17 0656)  Height: 5' 7" (170 2 cm) (08/08/17 1550)  Weight - Scale: 87 5 kg (192 lb 14 4 oz) (08/08/17 1550)  SpO2: 93 % (08/09/17 0656)  Exam:   Physical Exam   Constitutional: He is oriented to person, place, and time  He appears well-developed and well-nourished  No distress     HENT:   Head: September 24, 2020      Zeke Lamb MD  [address] [place] [id number]           [facility]  [address] [place] [id number]  Phone: [phone]  Fax: [phone]          Patient: Humberto Carlson   MR Number: [id number]   YOB: 1942   Date of Visit: 9/24/2020       Dear Dr. Zeke Lamb:    Thank you for referring Humberto Carlson to me for evaluation. Attached you will find relevant portions of my assessment and plan of care.    If you have questions, please do not hesitate to call me. I look forward to following Humberto Carlson along with you.    Sincerely,    Zain Morataya MD    Enclosure  CC:  No Recipients    If you would like to receive this communication electronically, please contact [email or web address] or [phone] to request more information on [facility] Link access.    For providers and/or their staff who would like to refer a patient to [facility], please contact us through our one-stop-shop provider referral line, [facility], at [phone].    If you feel you have received this communication in error or would no longer like to receive these types of communications, please e-mail [email or web address]          Normocephalic  Mouth/Throat: No oropharyngeal exudate  Eyes: Pupils are equal, round, and reactive to light  Right eye exhibits no discharge  Left eye exhibits no discharge  No scleral icterus  Neck: Normal range of motion  No JVD present  No tracheal deviation present  Cardiovascular:   No murmur heard  Irregularly irregular heart sounds   Pulmonary/Chest: Effort normal  No respiratory distress  He has no wheezes  He has no rales  He exhibits no tenderness  Abdominal: Soft  He exhibits no distension and no mass  There is no tenderness  There is no rebound and no guarding  Musculoskeletal: Normal range of motion  He exhibits no edema  Lymphadenopathy:     He has no cervical adenopathy  Neurological: He is alert and oriented to person, place, and time  Skin: Skin is warm  No rash noted  He is not diaphoretic  Vitals reviewed  Discharge instructions/Information to patient and family:   See after visit summary for information provided to patient and family  Discharge plan discussed with cardiology  Discharge planning Discussed with the patient, questions answered  Outpatient follow-up with cardiology    Provisions for Follow-Up Care:  See after visit summary for information related to follow-up care and any pertinent home health orders  Disposition:     Home    For Discharges to [facility]:   · Not Applicable to this Patient - Not Applicable to this Patient    Planned Readmission: no     Discharge Statement:  I spent 55 minutes discharging the patient  This time was spent on the day of discharge  I had direct contact with the patient on the day of discharge  Greater than 50% of the total time was spent examining patient, answering all patient questions, arranging and discussing plan of care with patient as well as directly providing post-discharge instructions  Additional time then spent on discharge activities      Discharge Medications:  See after visit summary for reconciled discharge medications provided to patient and family        ** Please Note: This note has been constructed using a voice recognition system **

## 2021-01-20 DIAGNOSIS — Z23 ENCOUNTER FOR IMMUNIZATION: ICD-10-CM

## 2021-01-22 ENCOUNTER — IMMUNIZATIONS (OUTPATIENT)
Dept: FAMILY MEDICINE CLINIC | Facility: HOSPITAL | Age: 81
End: 2021-01-22

## 2021-01-22 DIAGNOSIS — Z23 ENCOUNTER FOR IMMUNIZATION: Primary | ICD-10-CM

## 2021-01-22 PROCEDURE — 91301 SARS-COV-2 / COVID-19 MRNA VACCINE (MODERNA) 100 MCG: CPT | Performed by: INTERNAL MEDICINE

## 2021-01-22 PROCEDURE — 0011A SARS-COV-2 / COVID-19 MRNA VACCINE (MODERNA) 100 MCG: CPT | Performed by: INTERNAL MEDICINE

## 2021-02-15 NOTE — ED NOTES
Pt's wife -Madelin Naranjo- lucas, will return after he is settled in room   Asked we contact her once room assignment was given [phone]     April M Carlton Houston RN  08/08/17 [address] Counseling:  I discussed with the patient the risks of imiquimod including but not limited to erythema, scaling, itching, weeping, crusting, and pain.  Patient understands that the inflammatory response to imiquimod is variable from person to person and was educated regarded proper titration schedule.  If flu-like symptoms develop, patient knows to discontinue the medication and contact us.

## 2021-02-19 ENCOUNTER — IMMUNIZATIONS (OUTPATIENT)
Dept: FAMILY MEDICINE CLINIC | Facility: HOSPITAL | Age: 81
End: 2021-02-19

## 2021-02-19 DIAGNOSIS — Z23 ENCOUNTER FOR IMMUNIZATION: Primary | ICD-10-CM

## 2021-02-19 PROCEDURE — 0012A SARS-COV-2 / COVID-19 MRNA VACCINE (MODERNA) 100 MCG: CPT | Performed by: EMERGENCY MEDICINE

## 2021-02-19 PROCEDURE — 91301 SARS-COV-2 / COVID-19 MRNA VACCINE (MODERNA) 100 MCG: CPT | Performed by: EMERGENCY MEDICINE

## 2021-04-20 ENCOUNTER — TELEPHONE (OUTPATIENT)
Dept: CARDIOLOGY CLINIC | Facility: CLINIC | Age: 81
End: 2021-04-20

## 2021-04-20 NOTE — TELEPHONE ENCOUNTER
Pt is going for Left total knee arthoplasty on 05/11/2021 with [facility]  They are requesting a 3 day Eliquis hold  Ok to hold? Or should pt have a pre op cardiac clearance? Pt was last seen 07/02/20

## 2021-06-10 DIAGNOSIS — I10 ESSENTIAL HYPERTENSION: ICD-10-CM

## 2021-06-10 RX ORDER — METOPROLOL TARTRATE 50 MG/1
50 TABLET, FILM COATED ORAL 2 TIMES DAILY
Qty: 180 TABLET | Refills: 2 | Status: SHIPPED | OUTPATIENT
Start: 2021-06-10 | End: 2022-03-02

## 2021-07-09 DIAGNOSIS — I48.0 PAROXYSMAL ATRIAL FIBRILLATION (HCC): ICD-10-CM

## 2021-08-16 NOTE — PROGRESS NOTES
Cardiology  Follow Up      Roly Melgar   68 y o    male   MRN: [id number]  [address] [place] [id number] [phone]    PCP: Fátima Tinajero MD  Cardiologist: Chuck Gomez            Summary of recommendation  Heart healthy diet  Educational information provided  Eliquis 2 5 mg twice a day  Hemoglobin A1c for screening   Fasting lipid profile  CBC  Follow up will be scheduled with Dr Jenny Henning 1 year        Assessment/plan  Atrial fibrillation, persistent/chronic  He is on a rate control strategy  His chads 2 Vasc score is 2  Eliquis 2 5 mg b i d , metoprolol tartrate 50 mg q 12  Epistaxis, recurrent  No further episodes  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 [facility]   Screening for lipids:  Will get an updated lipid profile  LDL 74  Cardiac testing  · Holter monitor 6/15/20 24 hour holter monitor demonstrated atrial fibrillation with an average rate of 95 BPM; a minimum rate of 50 BPM; and a maximum rate of 146 BPM    TTE 6/15/20  EF 50%  No RWMA   Mild-to-moderate left atrial enlargement  Mild right atrial enlargement  HPI  Roly Melgar is a 67 Yo male with a history of atrial fibrillation  He has been on a rate control strategy maintained on metoprolol tartrate 50 mg Q 12  The patient has opted for rate control strategy; cardioversion has been discussed by his cardiologist in the past He has been advised chronic anticoagulation  Given his recurrent epistaxis the patient has been only taking Eliquis once a day  His cardiologist talked about a Watchman procedure, he was not interested  He does understand his increased risk of stroke/thromboembolism taking the Eliquis only once a day  Recently, he underwent a left total knee replacement 5/11/21 at [place] [address]  8/18/21  Mr Davidson is here for an annual visit    He seeing me given scheduling constraints with his cardiologist     Overall he is doing quite well  He had a total knee replacement in May  He is back being quite active, he works at a [employer] plant  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  With this activity he denies any chest pain or shortness of breath  He denies palpitations  Historically he did feel a few in the past but he has not felt any of recent  His EKG today shows AFib 100 beats per minute  He is compliant with his metoprolol twice daily  He has had problem with epistaxis in the past   On his own, he has reduced the dose to just 5 mg once daily  He does understand that this is subtherapeutic dose in this puts him at slightly higher risk for thromboembolism  After discussion, patient agrees to alter his prescription/dosing- will do 2 5 mg twice daily to get more consistent effect around the clock  Recent dental work for an abscessed tooth  This was fixed acutely  He is due for a cap    Most recent labs are available in [facility] everywhere  His BNP was satisfactory  His hemoglobin dropped from 16 down to 12, likely after his the orthopedic surgery on his knee  He is agreeable to getting a repeat CBC, will also get a fasting lipid profile and hemoglobin A1c  In the past his blood sugars were borderline at 101--121  He return in 1 year  He is advised to call if he needs something prior to  Meds were refilled upon request         I have spent 25 minutes with Patient  today in which greater than 50% of this time was spent in counseling/coordination of care regarding Intructions for management, Patient and family education, Importance of tx compliance and Risk factor reductions    Assessment  Diagnoses and all orders for this visit:    Pre-operative cardiovascular examination    Chronic atrial fibrillation (HCC)  -     POCT ECG  -     apixaban (Eliquis) 2 5 mg; Take 1 tablet (2 5 mg total) by mouth every 12 (twelve) hours    Epistaxis, recurrent    Chronic anticoagulation  - apixaban (Eliquis) 2 5 mg; Take 1 tablet (2 5 mg total) by mouth every 12 (twelve) hours    Screening for lipid disorders  -     Lipid panel; Future    Screening for diabetes mellitus  -     HEMOGLOBIN A1C W/ EAG ESTIMATION; Future    Anemia, unspecified type  -     CBC and Platelet; Future          Past Medical History:   Diagnosis Date    A-fib [facility])     Diverticulitis     Hypertension        Review of Systems   Constitutional: Negative for chills  HENT: Positive for hearing loss  Cardiovascular: Negative for chest pain, claudication, cyanosis, dyspnea on exertion, irregular heartbeat, leg swelling, near-syncope, orthopnea, palpitations, paroxysmal nocturnal dyspnea and syncope  Respiratory: Negative for cough and shortness of breath  Gastrointestinal: Negative for heartburn and nausea  Neurological: Negative for dizziness, focal weakness, headaches, light-headedness and weakness  All other systems reviewed and are negative  No Known Allergies    Current Outpatient Medications:     metoprolol tartrate (LOPRESSOR) 50 mg tablet, Take 1 tablet (50 mg total) by mouth 2 (two) times a day, Disp: 180 tablet, Rfl: 2    apixaban (Eliquis) 2 5 mg, Take 1 tablet (2 5 mg total) by mouth every 12 (twelve) hours, Disp: 60 tablet, Rfl: 11        Social History     Socioeconomic History    Marital status: /Civil Union     Spouse name: Not on file    Number of children: Not on file    Years of education: Not on file    Highest education level: Not on file   Occupational History    Not on file   Tobacco Use    Smoking status: Never Smoker    Smokeless tobacco: Never Used   Vaping Use    Vaping Use: Never used   Substance and Sexual Activity    Alcohol use:  Yes     Alcohol/week: 2 0 standard drinks     Types: 2 Cans of beer per week    Drug use: No    Sexual activity: Not on file   Other Topics Concern    Not on file   Social History Narrative    Not on file     Social Determinants of Health     Financial Resource Strain:     Difficulty of Paying Living Expenses:    Food Insecurity:     Worried About Running Out of Food in the Last Year:     [address] in the Last Year:    Transportation Needs:     Lack of Transportation (Medical):  Lack of Transportation (Non-Medical):    Physical Activity:     Days of Exercise per Week:     Minutes of Exercise per Session:    Stress:     Feeling of Stress :    Social Connections:     Frequency of Communication with Friends and Family:     Frequency of Social Gatherings with Friends and Family:     Attends Worship Services:     Active Member of Clubs or Organizations:     Attends Club or Organization Meetings:     Marital Status:    Intimate Partner Violence:     Fear of Current or Ex-Partner:     Emotionally Abused:     Physically Abused:     Sexually Abused:        Family History   Problem Relation Age of Onset    Hypertension Mother     Hypertension Father     Cancer Father     Heart attack Neg Hx     Stroke Neg Hx     Anuerysm Neg Hx     Clotting disorder Neg Hx     Arrhythmia Neg Hx     Heart failure Neg Hx     Coronary artery disease Neg Hx     Sudden death Neg Hx         scd    Hyperlipidemia Neg Hx        Physical Exam  Vitals and nursing note reviewed  Constitutional:       General: He is not in acute distress  HENT:      Head: Normocephalic and atraumatic  Eyes:      Conjunctiva/sclera: Conjunctivae normal    Cardiovascular:      Rate and Rhythm: Tachycardia present  Rhythm irregular  Pulses: Intact distal pulses  Heart sounds: Normal heart sounds  Pulmonary:      Effort: Pulmonary effort is normal       Breath sounds: Normal breath sounds  Abdominal:      General: Bowel sounds are normal       Palpations: Abdomen is soft  Musculoskeletal:         General: Normal range of motion  Cervical back: Normal range of motion and neck supple  Skin:     General: Skin is warm and dry  Neurological:      Mental Status: He is alert and oriented to person, place, and time  Vitals: Blood pressure 124/84, pulse 64, resp  rate 18, height 5' 8" (1 727 m), weight 83 9 kg (185 lb), SpO2 98 %  Wt Readings from Last 3 Encounters:   21 83 9 kg (185 lb)   20 88 9 kg (196 lb)   20 89 4 kg (197 lb 3 2 oz)         Labs & Results:  Lab Results   Component Value Date    WBC 6 12 2017    HGB 15 9 2017    HCT 47 2 2017    MCV 90 2017     2017     No results found for: BNP  No components found for: CHEM  Troponin I   Date Value Ref Range Status   2017 <0 02 <=0 04 ng/mL Final     Results for orders placed during the hospital encounter of 06/15/20    Echo complete with contrast if indicated    Narrative  [address]  [phone]    Transthoracic Echocardiogram  2D, M-mode, Doppler, and Color Doppler    Study date:  15-Ashish-2020    Patient: Clarisse Trejo  MR number: [id number]  Account number: [de-identified]  : [id number]  Age: 70 years  Gender: Male  Status: Outpatient  Location: [address] [place] [address] and Vascular Center  Height: 67 in  Weight: 197 lb  BP: 108/ 70 mmHg    Indications: Atrial fibrillation    Diagnoses: I48 0 - Atrial fibrillation    Sonographer:  DISHA Marx, RDCS  Referring Physician:  Manny Hernández DO  Group:  [facility]  Interpreting Physician:  Vega Stephenson MD    SUMMARY    LEFT VENTRICLE:  Systolic function was at the lower limits of normal  Ejection fraction was estimated to be 50 %  There were no regional wall motion abnormalities  LEFT ATRIUM:  The atrium was mildly to moderately dilated  RIGHT ATRIUM:  The atrium was mildly dilated  MITRAL VALVE:  There was trace regurgitation  AORTIC VALVE:  There was trace regurgitation  TRICUSPID VALVE:  There was trace regurgitation      HISTORY: PRIOR HISTORY: Atrial fibrillation; Hypertension    PROCEDURE: The study was performed in the [address] [place] [address] and Vascular Center  This was a routine study  The transthoracic approach was used  The study included complete 2D imaging, M-mode, complete spectral Doppler, and color Doppler  The  heart rate was 100 bpm, at the start of the study  Images were obtained from the parasternal, apical, subcostal, and suprasternal notch acoustic windows  Image quality was adequate  LEFT VENTRICLE: Size was normal  Systolic function was at the lower limits of normal  Ejection fraction was estimated to be 50 %  There were no regional wall motion abnormalities  Wall thickness was normal  DOPPLER: Transmitral flow  pattern: atrial fibrillation  Left ventricular diastolic function parameters were abnormal  There was no evidence of elevated ventricular filling pressure by Doppler parameters  RIGHT VENTRICLE: The size was normal  Systolic function was normal  Wall thickness was normal     LEFT ATRIUM: The atrium was mildly to moderately dilated  RIGHT ATRIUM: The atrium was mildly dilated  MITRAL VALVE: Valve structure was normal  There was normal leaflet separation  DOPPLER: The transmitral velocity was within the normal range  There was no evidence for stenosis  There was trace regurgitation  AORTIC VALVE: The valve was trileaflet  Leaflets exhibited normal thickness, mild calcification, normal cuspal separation, and sclerosis  DOPPLER: Transaortic velocity was within the normal range  There was no evidence for stenosis  There  was trace regurgitation  TRICUSPID VALVE: The valve structure was normal  There was normal leaflet separation  DOPPLER: The transtricuspid velocity was within the normal range  There was no evidence for stenosis  There was trace regurgitation  PULMONIC VALVE: Leaflets exhibited normal thickness, no calcification, and normal cuspal separation  DOPPLER: The transpulmonic velocity was within the normal range   There was no significant regurgitation  PERICARDIUM: There was no pericardial effusion  The pericardium was normal in appearance  AORTA: The root exhibited normal size  SYSTEMIC VEINS: IVC: The inferior vena cava was dilated  PULMONARY VEINS: DOPPLER: Doppler signals were not recordable in the pulmonary vein(s)  SYSTEM MEASUREMENT TABLES    2D  %FS: 28 38 %  AV Diam: 3 03 cm  EDV(Teich): 74 53 ml  EF(Cube): 63 26 %  EF(Teich): 55 24 %  ESV(Cube): 25 46 ml  ESV(Teich): 33 36 ml  IVSd: 1 04 cm  LA Area: 20 46 cm2  LA Diam: 4 15 cm  LVEDV MOD A4C: 52 98 ml  LVEF MOD A4C: 48 67 %  LVESV MOD A4C: 27 2 ml  LVIDd: 4 11 cm  LVIDs: 2 94 cm  LVLd A4C: 6 57 cm  LVLs A4C: 5 87 cm  LVPWd: 0 91 cm  RA Area: 14 48 cm2  RV Diam: 3 34 cm  SV MOD A4C: 25 78 ml  SV(Cube): 43 83 ml  SV(Teich): 41 17 ml    CW  TR MaxP 43 mmHg  TR Vmax: 2 02 m/s    PW  E': 0 07 m/s  E/E': 11 52  MV A Ayaan: 0 01 m/s  MV Dec Randolph: 6 59 m/s2  MV DecT: 126 52 ms  MV E Ayaan: 0 83 m/s  MV E/A Ratio: 124 15    [facility] Accredited Echocardiography Laboratory    Prepared and electronically signed by    Keya Granados MD  Signed 15-Ashish-2020 12:28:59    No results found for this or any previous visit  This note was completed in part utilizing m-modal fluency direct voice recognition software  Grammatical errors, random word insertion, spelling mistakes, and incomplete sentences may be an occasional consequence of the system secondary to software limitations, ambient noise and hardware issues  At the time of dictation, efforts were made to edit, clarify and /or correct errors  Please read the chart carefully and recognize, using context, where substitutions have occurred    If you have any questions or concerns about the context, text or information contained within the body of this dictation, please contact myself, the provider, for further clarification

## 2021-08-18 ENCOUNTER — OFFICE VISIT (OUTPATIENT)
Dept: CARDIOLOGY CLINIC | Facility: CLINIC | Age: 81
End: 2021-08-18
Payer: COMMERCIAL

## 2021-08-18 VITALS
DIASTOLIC BLOOD PRESSURE: 84 MMHG | SYSTOLIC BLOOD PRESSURE: 124 MMHG | HEIGHT: 68 IN | WEIGHT: 185 LBS | HEART RATE: 64 BPM | OXYGEN SATURATION: 98 % | BODY MASS INDEX: 28.04 KG/M2 | RESPIRATION RATE: 18 BRPM

## 2021-08-18 DIAGNOSIS — Z13.1 SCREENING FOR DIABETES MELLITUS: ICD-10-CM

## 2021-08-18 DIAGNOSIS — I48.20 CHRONIC ATRIAL FIBRILLATION (HCC): ICD-10-CM

## 2021-08-18 DIAGNOSIS — R04.0 EPISTAXIS, RECURRENT: ICD-10-CM

## 2021-08-18 DIAGNOSIS — Z01.810 PRE-OPERATIVE CARDIOVASCULAR EXAMINATION: Primary | ICD-10-CM

## 2021-08-18 DIAGNOSIS — Z13.220 SCREENING FOR LIPID DISORDERS: ICD-10-CM

## 2021-08-18 DIAGNOSIS — D64.9 ANEMIA, UNSPECIFIED TYPE: ICD-10-CM

## 2021-08-18 DIAGNOSIS — Z79.01 CHRONIC ANTICOAGULATION: ICD-10-CM

## 2021-08-18 PROCEDURE — 93000 ELECTROCARDIOGRAM COMPLETE: CPT | Performed by: NURSE PRACTITIONER

## 2021-08-18 PROCEDURE — 99214 OFFICE O/P EST MOD 30 MIN: CPT | Performed by: NURSE PRACTITIONER

## 2021-08-18 PROCEDURE — 1160F RVW MEDS BY RX/DR IN RCRD: CPT | Performed by: NURSE PRACTITIONER

## 2021-08-18 NOTE — LETTER
August 18, 2021     Jose Slater MD  [address] [place] [address]    Patient: Charli Alvarez   YOB: 1940   Date of Visit: 8/18/2021       Dear Dr Beatrice Craig:    Thank you for referring Bettina Davidson to me for evaluation  Below are my notes for this consultation  If you have questions, please do not hesitate to call me  I look forward to following your patient along with you  Sincerely,        KOSTAS Osorio        CC: Malathi Grewal, DO [place], [address]  8/18/2021  8:22 AM  Sign when Signing Visit  Cardiology  Follow Up      Charli Alvarez   68 y o    male   MRN: [id number]  [address]  [address] [address] [phone]    PCP: Jewel Romano MD  Cardiologist: Dr Brumfield            Summary of recommendation  Heart healthy diet  Educational information provided  Eliquis 2 5 mg twice a day  Hemoglobin A1c for screening   Fasting lipid profile  CBC  Follow up will be scheduled with Dr Suman Santos 1 year        Assessment/plan  Atrial fibrillation, persistent/chronic  He is on a rate control strategy  His chads 2 Vasc score is 2  Eliquis 2 5 mg b i d , metoprolol tartrate 50 mg q 12  Epistaxis, recurrent  No further episodes  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 [facility]   Screening for lipids:  Will get an updated lipid profile  LDL 74  Cardiac testing  · Holter monitor 6/15/20 24 hour holter monitor demonstrated atrial fibrillation with an average rate of 95 BPM; a minimum rate of 50 BPM; and a maximum rate of 146 BPM    TTE 6/15/20  EF 50%  No RWMA   Mild-to-moderate left atrial enlargement  Mild right atrial enlargement  HPI  Charli Alvarez is a 67 Yo male with a history of atrial fibrillation  He has been on a rate control strategy maintained on metoprolol tartrate 50 mg Q 12    The patient has opted for rate control strategy; cardioversion has been discussed by his cardiologist in the past He has been advised chronic anticoagulation  Given his recurrent epistaxis the patient has been only taking Eliquis once a day  His cardiologist talked about a Watchman procedure, he was not interested  He does understand his increased risk of stroke/thromboembolism taking the Eliquis only once a day  Recently, he underwent a left total knee replacement [facility]/11/21 [facility]  8/18/21  Mr Davidson is here for an annual visit  He seeing me given scheduling constraints with his cardiologist     Overall he is doing quite well  He had a total knee replacement in May  He is back being quite active, he works at a manufacturing chemical plant  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  With this activity he denies any chest pain or shortness of breath  He denies palpitations  Historically he did feel a few in the past but he has not felt any of recent  His EKG today shows AFib 100 beats per minute  He is compliant with his metoprolol twice daily  He has had problem with epistaxis in the past   On his own, he has reduced the dose to just 5 mg once daily  He does understand that this is subtherapeutic dose in this puts him at slightly higher risk for thromboembolism  After discussion, patient agrees to alter his prescription/dosing- will do 2 5 mg twice daily to get more consistent effect around the clock  Recent dental work for an abscessed tooth  This was fixed acutely  He is due for a cap    Most recent labs are available in [facility] everywhere  His BNP was satisfactory  His hemoglobin dropped from 16 down to 12, likely after his the orthopedic surgery on his knee  He is agreeable to getting a repeat CBC, will also get a fasting lipid profile and hemoglobin A1c  In the past his blood sugars were borderline at 101--121  He return in 1 year  He is advised to call if he needs something prior to    Meds were refilled upon request I have spent 25 minutes with Patient  today in which greater than 50% of this time was spent in counseling/coordination of care regarding Intructions for management, Patient and family education, Importance of tx compliance and Risk factor reductions  Assessment  Diagnoses and all orders for this visit:    Pre-operative cardiovascular examination    Chronic atrial fibrillation (HCC)  -     POCT ECG  -     apixaban (Eliquis) 2 5 mg; Take 1 tablet (2 5 mg total) by mouth every 12 (twelve) hours    Epistaxis, recurrent    Chronic anticoagulation  -     apixaban (Eliquis) 2 5 mg; Take 1 tablet (2 5 mg total) by mouth every 12 (twelve) hours    Screening for lipid disorders  -     Lipid panel; Future    Screening for diabetes mellitus  -     HEMOGLOBIN A1C W/ EAG ESTIMATION; Future    Anemia, unspecified type  -     CBC and Platelet; Future          Past Medical History:   Diagnosis Date    A-fib [facility])     Diverticulitis     Hypertension        Review of Systems   Constitutional: Negative for chills  HENT: Positive for hearing loss  Cardiovascular: Negative for chest pain, claudication, cyanosis, dyspnea on exertion, irregular heartbeat, leg swelling, near-syncope, orthopnea, palpitations, paroxysmal nocturnal dyspnea and syncope  Respiratory: Negative for cough and shortness of breath  Gastrointestinal: Negative for heartburn and nausea  Neurological: Negative for dizziness, focal weakness, headaches, light-headedness and weakness  All other systems reviewed and are negative  No Known Allergies        Current Outpatient Medications:     metoprolol tartrate (LOPRESSOR) 50 mg tablet, Take 1 tablet (50 mg total) by mouth 2 (two) times a day, Disp: 180 tablet, Rfl: 2    apixaban (Eliquis) 2 5 mg, Take 1 tablet (2 5 mg total) by mouth every 12 (twelve) hours, Disp: 60 tablet, Rfl: 11        Social History     Socioeconomic History    Marital status: /Civil Union     Spouse name: Not on file    Number of children: Not on file    Years of education: Not on file    Highest education level: Not on file   Occupational History    Not on file   Tobacco Use    Smoking status: Never Smoker    Smokeless tobacco: Never Used   Vaping Use    Vaping Use: Never used   Substance and Sexual Activity    Alcohol use: Yes     Alcohol/week: 2 0 standard drinks     Types: 2 Cans of beer per week    Drug use: No    Sexual activity: Not on file   Other Topics Concern    Not on file   Social History Narrative    Not on file     Social Determinants of Health     Financial Resource Strain:     Difficulty of Paying Living Expenses:    Food Insecurity:     Worried About Running Out of Food in the Last Year:     [address] in the Last Year:    Transportation Needs:     Lack of Transportation (Medical):  Lack of Transportation (Non-Medical):    Physical Activity:     Days of Exercise per Week:     Minutes of Exercise per Session:    Stress:     Feeling of Stress :    Social Connections:     Frequency of Communication with Friends and Family:     Frequency of Social Gatherings with Friends and Family:     Attends Christian Services:     Active Member of Clubs or Organizations:     Attends Club or Organization Meetings:     Marital Status:    Intimate Partner Violence:     Fear of Current or Ex-Partner:     Emotionally Abused:     Physically Abused:     Sexually Abused:        Family History   Problem Relation Age of Onset    Hypertension Mother     Hypertension Father     Cancer Father     Heart attack Neg Hx     Stroke Neg Hx     Anuerysm Neg Hx     Clotting disorder Neg Hx     Arrhythmia Neg Hx     Heart failure Neg Hx     Coronary artery disease Neg Hx     Sudden death Neg Hx         scd    Hyperlipidemia Neg Hx        Physical Exam  Vitals and nursing note reviewed  Constitutional:       General: He is not in acute distress  HENT:      Head: Normocephalic and atraumatic  Eyes:      Conjunctiva/sclera: Conjunctivae normal    Cardiovascular:      Rate and Rhythm: Tachycardia present  Rhythm irregular  Pulses: Intact distal pulses  Heart sounds: Normal heart sounds  Pulmonary:      Effort: Pulmonary effort is normal       Breath sounds: Normal breath sounds  Abdominal:      General: Bowel sounds are normal       Palpations: Abdomen is soft  Musculoskeletal:         General: Normal range of motion  Cervical back: Normal range of motion and neck supple  Skin:     General: Skin is warm and dry  Neurological:      Mental Status: He is alert and oriented to person, place, and time  Vitals: Blood pressure 124/84, pulse 64, resp  rate 18, height 5' 8" (1 727 m), weight 83 9 kg (185 lb), SpO2 98 %     Wt Readings from Last 3 Encounters:   21 83 9 kg (185 lb)   20 88 9 kg (196 lb)   20 89 4 kg (197 lb 3 2 oz)         Labs & Results:  Lab Results   Component Value Date    WBC 6 12 2017    HGB 15 9 2017    HCT 47 2 2017    MCV 90 2017     2017     No results found for: BNP  No components found for: CHEM  Troponin I   Date Value Ref Range Status   2017 <0 02 <=0 04 ng/mL Final     Results for orders placed during the hospital encounter of 06/15/20    Echo complete with contrast if indicated    Narrative  [place] [address] [address] [place] [address]  [phone]    Transthoracic Echocardiogram  2D, M-mode, Doppler, and Color Doppler    Study date:  15-Ashish-2020    Patient: Sanjana Gutierrez  MR number: [id number]  Account number: [de-identified]  : [id number]  Age: 70 years  Gender: Male  Status: Outpatient  Location: [facility] [place]  Height: 67 in  Weight: 197 lb  BP: 108/ 70 mmHg    Indications: Atrial fibrillation    Diagnoses: I48 0 - Atrial fibrillation    Sonographer:  DISHA Flowers, RDCS  Referring Physician:  Sukhdeep Cosme DO  Group:  [facility]  Interpreting Physician:  Pablo Pat MD    SUMMARY    LEFT VENTRICLE:  Systolic function was at the lower limits of normal  Ejection fraction was estimated to be 50 %  There were no regional wall motion abnormalities  LEFT ATRIUM:  The atrium was mildly to moderately dilated  RIGHT ATRIUM:  The atrium was mildly dilated  MITRAL VALVE:  There was trace regurgitation  AORTIC VALVE:  There was trace regurgitation  TRICUSPID VALVE:  There was trace regurgitation  HISTORY: PRIOR HISTORY: Atrial fibrillation; Hypertension    PROCEDURE: The study was performed in the [facility] [place]  This was a routine study  The transthoracic approach was used  The study included complete 2D imaging, M-mode, complete spectral Doppler, and color Doppler  The  heart rate was 100 bpm, at the start of the study  Images were obtained from the parasternal, apical, subcostal, and suprasternal notch acoustic windows  Image quality was adequate  LEFT VENTRICLE: Size was normal  Systolic function was at the lower limits of normal  Ejection fraction was estimated to be 50 %  There were no regional wall motion abnormalities  Wall thickness was normal  DOPPLER: Transmitral flow  pattern: atrial fibrillation  Left ventricular diastolic function parameters were abnormal  There was no evidence of elevated ventricular filling pressure by Doppler parameters  RIGHT VENTRICLE: The size was normal  Systolic function was normal  Wall thickness was normal     LEFT ATRIUM: The atrium was mildly to moderately dilated  RIGHT ATRIUM: The atrium was mildly dilated  MITRAL VALVE: Valve structure was normal  There was normal leaflet separation  DOPPLER: The transmitral velocity was within the normal range  There was no evidence for stenosis  There was trace regurgitation  AORTIC VALVE: The valve was trileaflet   Leaflets exhibited normal thickness, mild calcification, normal cuspal separation, and sclerosis  DOPPLER: Transaortic velocity was within the normal range  There was no evidence for stenosis  There  was trace regurgitation  TRICUSPID VALVE: The valve structure was normal  There was normal leaflet separation  DOPPLER: The transtricuspid velocity was within the normal range  There was no evidence for stenosis  There was trace regurgitation  PULMONIC VALVE: Leaflets exhibited normal thickness, no calcification, and normal cuspal separation  DOPPLER: The transpulmonic velocity was within the normal range  There was no significant regurgitation  PERICARDIUM: There was no pericardial effusion  The pericardium was normal in appearance  AORTA: The root exhibited normal size  SYSTEMIC VEINS: IVC: The inferior vena cava was dilated  PULMONARY VEINS: DOPPLER: Doppler signals were not recordable in the pulmonary vein(s)  SYSTEM MEASUREMENT TABLES    2D  %FS: 28 38 %  AV Diam: 3 03 cm  EDV(Teich): 74 53 ml  EF(Cube): 63 26 %  EF(Teich): 55 24 %  ESV(Cube): 25 46 ml  ESV(Teich): 33 36 ml  IVSd: 1 04 cm  LA Area: 20 46 cm2  LA Diam: 4 15 cm  LVEDV MOD A4C: 52 98 ml  LVEF MOD A4C: 48 67 %  LVESV MOD A4C: 27 2 ml  LVIDd: 4 11 cm  LVIDs: 2 94 cm  LVLd A4C: 6 57 cm  LVLs A4C: 5 87 cm  LVPWd: 0 91 cm  RA Area: 14 48 cm2  RV Diam: 3 34 cm  SV MOD A4C: 25 78 ml  SV(Cube): 43 83 ml  SV(Teich): 41 17 ml    CW  TR MaxP 43 mmHg  TR Vmax: 2 02 m/s    PW  E': 0 07 m/s  E/E': 11 52  MV A Ayaan: 0 01 m/s  MV Dec Comal: 6 59 m/s2  MV DecT: 126 52 ms  MV E Ayaan: 0 83 m/s  MV E/A Ratio: 124 15    [employer] Accredited Echocardiography Laboratory    Prepared and electronically signed by    Elizabeth Esquivel MD  Signed 15-Ashish-2020 12:28:59    No results found for this or any previous visit  This note was completed in part utilizing [employer] direct voice recognition software     Grammatical errors, random word insertion, spelling mistakes, and incomplete sentences may be an occasional consequence of the system secondary to software limitations, ambient noise and hardware issues  At the time of dictation, efforts were made to edit, clarify and /or correct errors  Please read the chart carefully and recognize, using context, where substitutions have occurred    If you have any questions or concerns about the context, text or information contained within the body of this dictation, please contact myself, the provider, for further clarification

## 2021-08-18 NOTE — PATIENT INSTRUCTIONS
Mediterranean Diet   AMBULATORY CARE:   A Mediterranean diet  is a meal plan that includes foods that are commonly eaten in countries that border the [place]  This meal plan may provide several health benefits  These include losing or maintaining weight, and decreasing blood pressure, blood sugar, and cholesterol levels  It may also help protect against certain health conditions such as heart disease, cancer, type 2 diabetes, and Alzheimer disease  Work with a dietitian to develop a meal plan that is right for you  Foods to include in the [address] [facility] diet:   · Include fruits and vegetables in each meal   Eat a variety of fresh fruits and vegetables  · Choose whole grains every day  These foods include whole-grain breads, pastas, and cereals  It also includes brown rice, quinoa, and millet  · Use unsaturated fats instead of saturated fats  Cook with olive or canola oil  Limit saturated fats, such as butter, margarine, and shortening  Saturated fat is an unhealthy fat that can increase your cholesterol levels  · Choose plant foods, poultry, and fish as your main sources of protein  ? Eat plant-based foods that provide protein,  such as lentils, beans, chickpeas, nuts, and seeds  Choose mostly plant-based foods in place of meat on most days of the week  ? Eat protein foods high in omega-3 fats  Fish high in omega-3 fats include salmon, trout, and tuna  Include these types of fish 1 or 2 times each week  Limit fish high in mercury, such as shark, swordfish, tilefish, and maricruz mackerel  Omega-3 fats are also found in walnuts and flaxseed  ? Choose poultry (chicken or turkey)  without skin instead of red meat  Red meat is high in saturated fat  Limit eggs and high-fat meats, such as shelby, sausage, and hot dogs  · Choose low-fat dairy foods  such as nonfat or 1% milk, or low-fat almond, cashew, or soy milk   Other examples include low-fat cheese, yogurt, and cottage cheese  · Limit sweets  Limit your intake of high-sugar foods, such as soda, desserts, and candy  · Talk to your healthcare provider about alcohol  Studies have shown that moderate intake of wine may reduce the risk of heart disease  A moderate amount of wine is 1 serving for women and men 65 years and older each day  Two servings is recommended for men 24to 59years of age each day  A serving of wine is 5 ounces  Other things you need to know if you follow the Mediterranean diet:   · Include foods high in iron and vitamin C   Plant-based foods that are high in iron include spinach, beans, tofu, and artichoke  Eat a serving of vitamin C with any iron-rich food to help your body absorb more iron  Examples include oranges, strawberries, cantaloupe, broccoli, and yellow peppers  · Get regular physical activity  The Mediterranean diet will have the most benefit if you get regular physical activity  Get 30 minutes of physical activity at least 5 days a week  Choose physical activities that increase your heart rate  Examples include walking, hiking, swimming, and riding a bike  Ask your healthcare provider about the best exercise plan for you  © Copyright [employer] 2021 Information is for End User's use only and may not be sold, redistributed or otherwise used for commercial purposes  All illustrations and images included in CareNotes® are the copyrighted property of [employer]  or Estee Licona  The above information is an  only  It is not intended as medical advice for individual conditions or treatments  Talk to your doctor, nurse or pharmacist before following any medical regimen to see if it is safe and effective for you

## 2021-12-19 ENCOUNTER — ANESTHESIA EVENT (OUTPATIENT)
Dept: PERIOP | Facility: AMBULARY SURGERY CENTER | Age: 81
End: 2021-12-19
Payer: COMMERCIAL

## 2021-12-19 PROBLEM — I10 HTN (HYPERTENSION): Status: ACTIVE | Noted: 2021-12-19

## 2021-12-19 PROBLEM — Z79.01 ON CONTINUOUS ORAL ANTICOAGULATION: Status: ACTIVE | Noted: 2021-12-19

## 2021-12-20 ENCOUNTER — ANESTHESIA (OUTPATIENT)
Dept: PERIOP | Facility: AMBULARY SURGERY CENTER | Age: 81
End: 2021-12-20
Payer: COMMERCIAL

## 2021-12-20 ENCOUNTER — HOSPITAL ENCOUNTER (OUTPATIENT)
Facility: AMBULARY SURGERY CENTER | Age: 81
Setting detail: OUTPATIENT SURGERY
Discharge: HOME | End: 2021-12-20
Attending: OPHTHALMOLOGY | Admitting: OPHTHALMOLOGY
Payer: COMMERCIAL

## 2021-12-20 VITALS
HEART RATE: 78 BPM | BODY MASS INDEX: 28.04 KG/M2 | RESPIRATION RATE: 18 BRPM | TEMPERATURE: 97.2 F | SYSTOLIC BLOOD PRESSURE: 131 MMHG | OXYGEN SATURATION: 98 % | WEIGHT: 185 LBS | HEIGHT: 68 IN | DIASTOLIC BLOOD PRESSURE: 74 MMHG

## 2021-12-20 DIAGNOSIS — H25.12 AGE-RELATED NUCLEAR CATARACT OF LEFT EYE: Primary | ICD-10-CM

## 2021-12-20 PROBLEM — Z96.653 S/P TOTAL KNEE REPLACEMENT, BILATERAL: Status: ACTIVE | Noted: 2021-12-20

## 2021-12-20 PROCEDURE — V2632 POST CHMBR INTRAOCULAR LENS: HCPCS | Performed by: OPHTHALMOLOGY

## 2021-12-20 DEVICE — IOL SN60WF 16.0: Type: IMPLANTABLE DEVICE | Status: FUNCTIONAL

## 2021-12-20 RX ORDER — LIDOCAINE HYDROCHLORIDE 10 MG/ML
INJECTION, SOLUTION EPIDURAL; INFILTRATION; INTRACAUDAL; PERINEURAL AS NEEDED
Status: DISCONTINUED | OUTPATIENT
Start: 2021-12-20 | End: 2021-12-20 | Stop reason: HOSPADM

## 2021-12-20 RX ORDER — PHENYLEPHRINE HCL 2.5 %
1 DROPS OPHTHALMIC (EYE)
Status: ACTIVE | OUTPATIENT
Start: 2021-12-20 | End: 2021-12-20

## 2021-12-20 RX ORDER — TETRACAINE HYDROCHLORIDE 5 MG/ML
1 SOLUTION OPHTHALMIC ONCE
Status: COMPLETED | OUTPATIENT
Start: 2021-12-20 | End: 2021-12-20

## 2021-12-20 RX ORDER — KETOROLAC TROMETHAMINE 5 MG/ML
1 SOLUTION OPHTHALMIC 4 TIMES DAILY
Qty: 5 ML | Refills: 0
Start: 2021-12-20

## 2021-12-20 RX ORDER — CYCLOPENTOLATE HYDROCHLORIDE 10 MG/ML
1 SOLUTION/ DROPS OPHTHALMIC
Status: ACTIVE | OUTPATIENT
Start: 2021-12-20 | End: 2021-12-20

## 2021-12-20 RX ORDER — TETRACAINE HYDROCHLORIDE 5 MG/ML
SOLUTION OPHTHALMIC AS NEEDED
Status: DISCONTINUED | OUTPATIENT
Start: 2021-12-20 | End: 2021-12-20 | Stop reason: HOSPADM

## 2021-12-20 RX ORDER — KETOROLAC TROMETHAMINE 5 MG/ML
1 SOLUTION OPHTHALMIC
Status: ACTIVE | OUTPATIENT
Start: 2021-12-20 | End: 2021-12-20

## 2021-12-20 RX ORDER — LIDOCAINE HYDROCHLORIDE 20 MG/ML
1 JELLY TOPICAL
Status: COMPLETED | OUTPATIENT
Start: 2021-12-20 | End: 2021-12-20

## 2021-12-20 RX ORDER — GATIFLOXACIN 5 MG/ML
1 SOLUTION/ DROPS OPHTHALMIC 2 TIMES DAILY
Qty: 3 ML | Refills: 0
Start: 2021-12-20

## 2021-12-20 RX ORDER — BALANCED SALT SOLUTION 6.4; .75; .48; .3; 3.9; 1.7 MG/ML; MG/ML; MG/ML; MG/ML; MG/ML; MG/ML
SOLUTION OPHTHALMIC AS NEEDED
Status: DISCONTINUED | OUTPATIENT
Start: 2021-12-20 | End: 2021-12-20 | Stop reason: HOSPADM

## 2021-12-20 RX ORDER — MIDAZOLAM HYDROCHLORIDE 2 MG/2ML
INJECTION, SOLUTION INTRAMUSCULAR; INTRAVENOUS AS NEEDED
Status: DISCONTINUED | OUTPATIENT
Start: 2021-12-20 | End: 2021-12-20

## 2021-12-20 RX ORDER — GATIFLOXACIN 5 MG/ML
SOLUTION/ DROPS OPHTHALMIC AS NEEDED
Status: DISCONTINUED | OUTPATIENT
Start: 2021-12-20 | End: 2021-12-20 | Stop reason: HOSPADM

## 2021-12-20 RX ADMIN — LIDOCAINE HYDROCHLORIDE 1 APPLICATION: 20 JELLY TOPICAL at 14:20

## 2021-12-20 RX ADMIN — MIDAZOLAM 1 MG: 1 INJECTION INTRAMUSCULAR; INTRAVENOUS at 15:19

## 2021-12-20 RX ADMIN — KETOROLAC TROMETHAMINE 1 DROP: 5 SOLUTION OPHTHALMIC at 14:34

## 2021-12-20 RX ADMIN — PHENYLEPHRINE HYDROCHLORIDE 1 DROP: 25 SOLUTION/ DROPS OPHTHALMIC at 14:50

## 2021-12-20 RX ADMIN — PHENYLEPHRINE HYDROCHLORIDE 1 DROP: 25 SOLUTION/ DROPS OPHTHALMIC at 14:34

## 2021-12-20 RX ADMIN — MIDAZOLAM 1 MG: 1 INJECTION INTRAMUSCULAR; INTRAVENOUS at 15:22

## 2021-12-20 RX ADMIN — KETOROLAC TROMETHAMINE 1 DROP: 5 SOLUTION OPHTHALMIC at 14:50

## 2021-12-20 RX ADMIN — CYCLOPENTOLATE HYDROCHLORIDE 1 DROP: 10 SOLUTION/ DROPS OPHTHALMIC at 14:35

## 2021-12-20 RX ADMIN — KETOROLAC TROMETHAMINE 1 DROP: 5 SOLUTION OPHTHALMIC at 14:20

## 2021-12-20 RX ADMIN — LIDOCAINE HYDROCHLORIDE 1 APPLICATION: 20 JELLY TOPICAL at 14:34

## 2021-12-20 RX ADMIN — CYCLOPENTOLATE HYDROCHLORIDE 1 DROP: 10 SOLUTION/ DROPS OPHTHALMIC at 14:50

## 2021-12-20 RX ADMIN — TETRACAINE HYDROCHLORIDE 1 DROP: 5 SOLUTION OPHTHALMIC at 14:20

## 2021-12-20 RX ADMIN — LIDOCAINE HYDROCHLORIDE 1 APPLICATION: 20 JELLY TOPICAL at 14:35

## 2021-12-20 RX ADMIN — CYCLOPENTOLATE HYDROCHLORIDE 1 DROP: 10 SOLUTION/ DROPS OPHTHALMIC at 14:20

## 2021-12-20 RX ADMIN — PHENYLEPHRINE HYDROCHLORIDE 1 DROP: 25 SOLUTION/ DROPS OPHTHALMIC at 14:20

## 2022-03-02 DIAGNOSIS — I10 ESSENTIAL HYPERTENSION: ICD-10-CM

## 2022-03-02 DIAGNOSIS — I48.20 CHRONIC ATRIAL FIBRILLATION (HCC): ICD-10-CM

## 2022-03-02 DIAGNOSIS — Z79.01 CHRONIC ANTICOAGULATION: ICD-10-CM

## 2022-03-02 RX ORDER — METOPROLOL TARTRATE 50 MG/1
50 TABLET, FILM COATED ORAL 2 TIMES DAILY
Qty: 180 TABLET | Refills: 2 | Status: SHIPPED | OUTPATIENT
Start: 2022-03-02

## 2022-07-13 ENCOUNTER — TELEPHONE (OUTPATIENT)
Dept: CARDIOLOGY CLINIC | Facility: CLINIC | Age: 82
End: 2022-07-13

## 2022-07-13 NOTE — TELEPHONE ENCOUNTER
Received call from [facility] regarding upcoming carpal tunnel surgery pt is to have on 8/2/22  They do not need an EKG for surgery but are asking if he can/should hold Eliquis prior to procedure  Can pt be cleared without coming in for visit or would you like to see him? Please advise

## 2022-07-14 NOTE — TELEPHONE ENCOUNTER
Looks like you are fully booked until September  Would you be willing to [facility] or do a pullover one day?

## 2022-07-20 ENCOUNTER — OFFICE VISIT (OUTPATIENT)
Dept: CARDIOLOGY CLINIC | Facility: CLINIC | Age: 82
End: 2022-07-20
Payer: COMMERCIAL

## 2022-07-20 VITALS
WEIGHT: 194.2 LBS | HEIGHT: 68 IN | HEART RATE: 76 BPM | DIASTOLIC BLOOD PRESSURE: 62 MMHG | SYSTOLIC BLOOD PRESSURE: 102 MMHG | OXYGEN SATURATION: 98 % | BODY MASS INDEX: 29.43 KG/M2

## 2022-07-20 DIAGNOSIS — I48.20 CHRONIC ATRIAL FIBRILLATION (HCC): ICD-10-CM

## 2022-07-20 DIAGNOSIS — Z01.818 PRE-OP EXAM: Primary | ICD-10-CM

## 2022-07-20 DIAGNOSIS — R04.0 EPISTAXIS, RECURRENT: ICD-10-CM

## 2022-07-20 DIAGNOSIS — I10 PRIMARY HYPERTENSION: ICD-10-CM

## 2022-07-20 PROCEDURE — 93000 ELECTROCARDIOGRAM COMPLETE: CPT | Performed by: INTERNAL MEDICINE

## 2022-07-20 PROCEDURE — 99214 OFFICE O/P EST MOD 30 MIN: CPT | Performed by: INTERNAL MEDICINE

## 2022-07-20 NOTE — PROGRESS NOTES
Cardiology Follow Up    Rossy Kothari  1940  [id number]  [facility] [address] [address]    1  Pre-op exam  POCT ECG   2  Chronic atrial fibrillation (HCC)  Echo complete w/ contrast if indicated   3  Primary hypertension  Echo complete w/ contrast if indicated   4  Epistaxis, recurrent  Echo complete w/ contrast if indicated       Discussion/Summary:  It has been about 2 years since I seen the patient he needs a preop exam, from my standpoint he is low risk for carpal tunnel surgery  He has chronic atrial fibrillation he can hold Eliquis 2 days preoperatively and then resume postop  Blood pressure is well controlled  He shows no signs decompensated heart failure  Epistaxis has resolved he is tolerating Eliquis at a half dose  He understands slightly increased risk of CVA in comparison to the 5 mg dosing  He is due for an echocardiogram this can be done later in the fall it does not need to be done preoperatively  Interval History:  45-year-old gentleman with chronic atrial fibrillation on a rate control strategy  Presents for a preoperative cardiovascular risk assessment  It has been about 2 years since he came to the office  Overall he has been feeling great 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  He remains quite physically active  He can walk climb stairs do moderate level housework with no limitations  Denies any chest pain, shortness of breath, palpitations, lightheadedness, dizziness, or syncope  Eliquis was reduced to half dose due to severe epistaxis in the past   He needs carpal tunnel surgery      Problem List     Carpal tunnel syndrome of right wrist    Atrial fibrillation [facility])        Past Medical History:   Diagnosis Date    A-fib [facility])     Colon polyp     Diverticulitis     Diverticulitis     Hypertension      Social History Socioeconomic History    Marital status: /Civil Union     Spouse name: Not on file    Number of children: Not on file    Years of education: Not on file    Highest education level: Not on file   Occupational History    Not on file   Tobacco Use    Smoking status: Never Smoker    Smokeless tobacco: Never Used   Vaping Use    Vaping Use: Never used   Substance and Sexual Activity    Alcohol use: Yes     Alcohol/week: 2 0 standard drinks     Types: 2 Cans of beer per week    Drug use: No    Sexual activity: Not on file   Other Topics Concern    Not on file   Social History Narrative    Not on file     Social Determinants of Health     Financial Resource Strain: Not on file   Food Insecurity: Not on file   Transportation Needs: Not on file   Physical Activity: Not on file   Stress: Not on file   Social Connections: Not on file   Intimate Partner Violence: Not on file   Housing Stability: Not on file      Family History   Problem Relation Age of Onset    Hypertension Mother     Hypertension Father     Cancer Father     Heart attack Neg Hx     Stroke Neg Hx     Anuerysm Neg Hx     Clotting disorder Neg Hx     Arrhythmia Neg Hx     Heart failure Neg Hx     Coronary artery disease Neg Hx     Sudden death Neg Hx         scd    Hyperlipidemia Neg Hx      Past Surgical History:   Procedure Laterality Date    COLON SURGERY  1990    12 inches removed    HERNIA REPAIR      inguinal    HERNIA REPAIR      abdominal    JOINT REPLACEMENT Left 05/2021    knee    JOINT REPLACEMENT Right 2014    knee    [place] REVISE MEDIAN N/CARPAL TUNNEL SURG Right 10/3/2017    Procedure: CARPAL TUNNEL RELEASE;  Surgeon: Demi Sahu DO;  [facility];  Service: Orthopedics    [place] XCAPSL CTRC RMVL INSJ IO LENS PROSTH W/O ECP Left 12/20/2021    Procedure: EXTRACTION EXTRACAPSULAR CATARACT PHACO INTRAOCULAR LENS (IOL);   Surgeon: Migdalia Webster MD;  Location: [place] [facility] MAIN OR;  Service: Ophthalmology   [facility] REPLACEMENT TOTAL KNEE         Current Outpatient Medications:     apixaban (Eliquis) 2 5 mg, Take 1 tablet (2 5 mg total) by mouth every 12 (twelve) hours, Disp: 180 tablet, Rfl: 2    metoprolol tartrate (LOPRESSOR) 50 mg tablet, Take 1 tablet (50 mg total) by mouth 2 (two) times a day, Disp: 180 tablet, Rfl: 2    gatifloxacin (ZYMAXID) 0 5 %, Administer 1 drop into the left eye 2 (two) times a day (Patient not taking: No sig reported), Disp: 3 mL, Rfl: 0    ketorolac (ACULAR) 0 5 % ophthalmic solution, Administer 1 drop into the left eye 4 (four) times a day (Patient not taking: No sig reported), Disp: 5 mL, Rfl: 0  No Known Allergies    Labs:     Chemistry        Component Value Date/Time    K 4 6 08/09/2017 0434     08/09/2017 0434    CO2 29 08/09/2017 0434    BUN 16 08/09/2017 0434    CREATININE 0 88 08/09/2017 0434        Component Value Date/Time    CALCIUM 9 0 08/09/2017 0434            No results found for: CHOL  Lab Results   Component Value Date    HDL 43 08/09/2017     Lab Results   Component Value Date    LDLCALC 74 08/09/2017     Lab Results   Component Value Date    TRIG 107 08/09/2017     No results found for: CHOLHDL    Imaging: No results found  ECG:  AFib nonspecific ST and T changes    Review of Systems   Constitutional: Negative  HENT: Negative  Eyes: Negative  Cardiovascular: Negative  Respiratory: Negative  Endocrine: Negative  Hematologic/Lymphatic: Negative  Skin: Negative  Musculoskeletal: Negative  Gastrointestinal: Negative  Genitourinary: Negative  Neurological: Negative  Psychiatric/Behavioral: Negative  All other systems reviewed and are negative  Vitals:    07/20/22 1322   BP: 102/62   Pulse: 76   SpO2: 98%     Vitals:    07/20/22 1322   Weight: 88 1 kg (194 lb 3 2 oz)     Height: 5' 8" (172 7 cm)   Body mass index is 29 53 kg/m²      Physical Exam:  Vital signs reviewed  General:  Alert and cooperative, appears stated age, no acute distress  HEENT:  PERRLA, EOMI, no scleral icterus, no conjunctival pallor  Neck:  No lymphadenopathy, no thyromegaly, no carotid bruits, no elevated JVP  Heart:  Regular rate and rhythm, normal S1/S2, no S3/S4, no murmur, rubs or gallops  PMI nondisplaced  Lungs:  Clear to auscultation bilaterally, no wheezes rales or rhonchi  Abdomen:  Soft, non-tender, positive bowel sounds, no rebound or guarding,   no organomegaly   Extremities:  Normal range of motion    No clubbing, cyanosis or edema   Vascular:  2+ pedal pulses  Skin:  No rashes or lesions on exposed skin  Neurologic:  Cranial nerves II-XII grossly intact without focal deficits  Psych:  Normal mood and affect

## 2022-07-20 NOTE — LETTER
Cardiology Pre Operative Clearance      PRE OPERATIVE CARDIAC RISK ASSESSMENT    07/20/22    Anson Maxim  1940  [id number]    Date of Surgery: 08/02/2022    Type of Surgery: Neuroplasty Ulnar Nerve at L elbow, Endoscopic L Carpal Tunnel Release, possible mini open    Surgeon: Dena Lilly MD    No Cardiac Contraindication for Planned Surgical Procedures    Anticoagulation: Eliquis 2 5 mg    Physician Comment: low CV risk for surgery    Can hold eliquis 48 hours if needed  Electronically Signed: Vick

## 2022-12-01 ENCOUNTER — HOSPITAL ENCOUNTER (OUTPATIENT)
Dept: NON INVASIVE DIAGNOSTICS | Facility: CLINIC | Age: 82
Discharge: HOME | End: 2022-12-01

## 2022-12-01 VITALS
BODY MASS INDEX: 29.4 KG/M2 | WEIGHT: 194 LBS | DIASTOLIC BLOOD PRESSURE: 62 MMHG | SYSTOLIC BLOOD PRESSURE: 102 MMHG | HEART RATE: 87 BPM | HEIGHT: 68 IN

## 2022-12-01 DIAGNOSIS — R04.0 EPISTAXIS, RECURRENT: ICD-10-CM

## 2022-12-01 DIAGNOSIS — I48.20 CHRONIC ATRIAL FIBRILLATION (HCC): ICD-10-CM

## 2022-12-01 DIAGNOSIS — I10 PRIMARY HYPERTENSION: ICD-10-CM

## 2022-12-01 LAB
AORTIC ROOT: 3.1 CM
APICAL FOUR CHAMBER EJECTION FRACTION: 57 %
ASCENDING AORTA: 3.2 CM
FRACTIONAL SHORTENING: 30 (ref 28–44)
INTERVENTRICULAR SEPTUM IN DIASTOLE (PARASTERNAL SHORT AXIS VIEW): 1.2 CM
INTERVENTRICULAR SEPTUM: 1.2 CM (ref 0.6–1.1)
LAAS-AP2: 27.1 CM2
LAAS-AP4: 29.2 CM2
LEFT ATRIUM SIZE: 5.2 CM
LEFT INTERNAL DIMENSION IN SYSTOLE: 3.1 CM (ref 2.1–4)
LEFT VENTRICULAR INTERNAL DIMENSION IN DIASTOLE: 4.4 CM (ref 3.5–6)
LEFT VENTRICULAR POSTERIOR WALL IN END DIASTOLE: 1.4 CM
LEFT VENTRICULAR STROKE VOLUME: 49 ML
LVSV (TEICH): 49 ML
RIGHT ATRIUM AREA SYSTOLE A4C: 20.7 CM2
RIGHT VENTRICLE ID DIMENSION: 4.1 CM
SL CV LEFT ATRIUM LENGTH A2C: 6.7 CM
SL CV LV EF: 60
SL CV PED ECHO LEFT VENTRICLE DIASTOLIC VOLUME (MOD BIPLANE) 2D: 86 ML
SL CV PED ECHO LEFT VENTRICLE SYSTOLIC VOLUME (MOD BIPLANE) 2D: 37 ML

## 2023-01-04 DIAGNOSIS — Z79.01 CHRONIC ANTICOAGULATION: ICD-10-CM

## 2023-01-04 DIAGNOSIS — I48.20 CHRONIC ATRIAL FIBRILLATION (HCC): ICD-10-CM

## 2023-01-04 DIAGNOSIS — I10 ESSENTIAL HYPERTENSION: ICD-10-CM

## 2023-01-04 RX ORDER — METOPROLOL TARTRATE 50 MG/1
50 TABLET, FILM COATED ORAL 2 TIMES DAILY
Qty: 180 TABLET | Refills: 3 | Status: SHIPPED | OUTPATIENT
Start: 2023-01-04

## 2024-01-16 DIAGNOSIS — I48.20 CHRONIC ATRIAL FIBRILLATION (HCC): ICD-10-CM

## 2024-01-16 DIAGNOSIS — Z79.01 CHRONIC ANTICOAGULATION: ICD-10-CM

## 2024-01-16 RX ORDER — APIXABAN 2.5 MG/1
2.5 TABLET, FILM COATED ORAL EVERY 12 HOURS
Qty: 60 TABLET | Refills: 11 | Status: SHIPPED | OUTPATIENT
Start: 2024-01-16

## 2024-02-12 DIAGNOSIS — I10 ESSENTIAL HYPERTENSION: ICD-10-CM

## 2024-02-15 RX ORDER — METOPROLOL TARTRATE 50 MG/1
50 TABLET, FILM COATED ORAL 2 TIMES DAILY
Qty: 180 TABLET | Refills: 3 | Status: SHIPPED | OUTPATIENT
Start: 2024-02-15

## 2024-03-14 ENCOUNTER — OFFICE VISIT (OUTPATIENT)
Dept: CARDIOLOGY CLINIC | Facility: CLINIC | Age: 84
End: 2024-03-14
Payer: COMMERCIAL

## 2024-03-14 VITALS
HEART RATE: 99 BPM | DIASTOLIC BLOOD PRESSURE: 66 MMHG | SYSTOLIC BLOOD PRESSURE: 108 MMHG | HEIGHT: 68 IN | WEIGHT: 201 LBS | BODY MASS INDEX: 30.46 KG/M2

## 2024-03-14 DIAGNOSIS — I48.20 CHRONIC ATRIAL FIBRILLATION (HCC): Primary | ICD-10-CM

## 2024-03-14 DIAGNOSIS — I10 PRIMARY HYPERTENSION: ICD-10-CM

## 2024-03-14 DIAGNOSIS — R04.0 EPISTAXIS, RECURRENT: ICD-10-CM

## 2024-03-14 PROCEDURE — 99214 OFFICE O/P EST MOD 30 MIN: CPT | Performed by: INTERNAL MEDICINE

## 2024-03-14 PROCEDURE — 93000 ELECTROCARDIOGRAM COMPLETE: CPT | Performed by: INTERNAL MEDICINE

## 2024-03-14 NOTE — PROGRESS NOTES
Cardiology Follow Up    Edin Davidson  1940  [id number]  [facility]  [address]  [place] [address]    1. Chronic atrial fibrillation (HCC)  POCT ECG    CBC and differential    Comprehensive metabolic panel    Lipid Panel with Direct LDL reflex      2. Epistaxis, recurrent  CBC and differential    Comprehensive metabolic panel    Lipid Panel with Direct LDL reflex      3. Primary hypertension  CBC and differential    Comprehensive metabolic panel    Lipid Panel with Direct LDL reflex          Discussion/Summary: Overall has been doing well offers no symptoms.  Functional capacity is good.  Blood pressure is well-controlled.  He is due for basic blood work to follow-up on lipids and renal function.  Atrial fibrillation is rate controlled continue anticoagulant patient he has decided on half dose Eliquis due to prior risks of severe epistaxis.  Understands risks.     Interval History:  83-year-old gentleman with chronic atrial fibrillation on a rate control strategy.  Presents for a preoperative cardiovascular risk assessment.  It has been about 2 years since he came to the office.  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  He remains quite physically active.      Overall has been doing well continues to work several days a week.  Denies any exertional chest pain, shortness of breath, palpitations, lightheadedness, dizziness, or syncope.  Is been a lower extremity edema, PND, orthopnea.  Is been taking her medications prescribed.      Problem List       Carpal tunnel syndrome of right wrist    Atrial fibrillation (HCC)          Past Medical History:   Diagnosis Date    A-fib (HCC)     Colon polyp     Diverticulitis     Diverticulitis     Hypertension      Social History     Socioeconomic History    Marital status: /Civil Union     Spouse name: Not on  file    Number of children: Not on file    Years of education: Not on file    Highest education level: Not on file   Occupational History    Not on file   Tobacco Use    Smoking status: Never    Smokeless tobacco: Never   Vaping Use    Vaping status: Never Used   Substance and Sexual Activity    Alcohol use: Yes     Alcohol/week: 2.0 standard drinks of alcohol     Types: 2 Cans of beer per week    Drug use: No    Sexual activity: Not on file   Other Topics Concern    Not on file   Social History Narrative    Not on file     Social Determinants of Health     Financial Resource Strain: Not on file   Food Insecurity: Not on file   Transportation Needs: Not on file   Physical Activity: Not on file   Stress: Not on file   Social Connections: Not on file   Intimate Partner Violence: Not on file   Housing Stability: Not on file      Family History   Problem Relation Age of Onset    Hypertension Mother     Hypertension Father     Cancer Father     Heart attack Neg Hx     Stroke Neg Hx     Anuerysm Neg Hx     Clotting disorder Neg Hx     Arrhythmia Neg Hx     Heart failure Neg Hx     Coronary artery disease Neg Hx     Sudden death Neg Hx         scd    Hyperlipidemia Neg Hx      Past Surgical History:   Procedure Laterality Date    COLON SURGERY  1990    12 inches removed    HERNIA REPAIR      inguinal    HERNIA REPAIR      abdominal    JOINT REPLACEMENT Left 05/2021    knee    JOINT REPLACEMENT Right 2014    knee    [place] REVISE MEDIAN N/CARPAL TUNNEL SURG Right 10/3/2017    Procedure: CARPAL TUNNEL RELEASE;  Surgeon: Rudi Mares DO;  Location:  Main OR;  Service: Orthopedics    [place] XCAPSL CTRC RMVL INSJ IO LENS PROSTH W/O ECP Left 12/20/2021    Procedure: EXTRACTION EXTRACAPSULAR CATARACT PHACO INTRAOCULAR LENS (IOL);  Surgeon: Rudi Johnson MD;  Location: [facility] MAIN OR;  Service: Ophthalmology    REPLACEMENT TOTAL KNEE         Current Outpatient Medications:     Eliquis 2.5 MG, TAKE 1 TABLET EVERY 12 HOURS, Disp: 60  "tablet, Rfl: 11    metoprolol tartrate (LOPRESSOR) 50 mg tablet, TAKE 1 TABLET TWICE A DAY, Disp: 180 tablet, Rfl: 3    gatifloxacin (ZYMAXID) 0.5 %, Administer 1 drop into the left eye 2 (two) times a day (Patient not taking: Reported on 7/20/2022), Disp: 3 mL, Rfl: 0    ketorolac (ACULAR) 0.5 % ophthalmic solution, Administer 1 drop into the left eye 4 (four) times a day (Patient not taking: No sig reported), Disp: 5 mL, Rfl: 0  No Known Allergies    Labs:     Chemistry        Component Value Date/Time    K 4.6 05/12/2021 0400     05/12/2021 0400    CO2 28 05/12/2021 0400    BUN 17 05/12/2021 0400    CREATININE 0.80 05/12/2021 0400        Component Value Date/Time    CALCIUM 8.9 05/12/2021 0400    ALKPHOS 65 05/04/2021 0812    AST 20 05/04/2021 0812    ALT 18 05/04/2021 0812            No results found for: \"CHOL\"  Lab Results   Component Value Date    HDL 43 08/09/2017     Lab Results   Component Value Date    LDLCALC 74 08/09/2017     Lab Results   Component Value Date    TRIG 107 08/09/2017     No results found for: \"CHOLHDL\"    Imaging: No results found.    ECG:  AFib nonspecific ST and T changes    Review of Systems   Constitutional: Negative.   HENT: Negative.     Eyes: Negative.    Cardiovascular: Negative.    Respiratory: Negative.     Endocrine: Negative.    Hematologic/Lymphatic: Negative.    Skin: Negative.    Musculoskeletal: Negative.    Gastrointestinal: Negative.    Genitourinary: Negative.    Neurological: Negative.    Psychiatric/Behavioral: Negative.     All other systems reviewed and are negative.      Vitals:    03/14/24 1047   BP: 108/66   Pulse: 99     Vitals:    03/14/24 1047   Weight: 91.2 kg (201 lb)     Height: 5' 8\" (172.7 cm)   Body mass index is 30.56 kg/m².    Physical Exam:  Vital signs reviewed  General:  Alert and cooperative, appears stated age, no acute distress  HEENT:  PERRLA, EOMI, no scleral icterus, no conjunctival pallor  Neck:  No lymphadenopathy, no thyromegaly, no " carotid bruits, no elevated JVP  Heart:  Regular rate and rhythm, normal S1/S2, no S3/S4, no murmur, rubs or gallops.  PMI nondisplaced  Lungs:  Clear to auscultation bilaterally, no wheezes rales or rhonchi  Abdomen:  Soft, non-tender, positive bowel sounds, no rebound or guarding,   no organomegaly   Extremities:  Normal range of motion.  No clubbing, cyanosis or edema   Vascular:  2+ pedal pulses  Skin:  No rashes or lesions on exposed skin  Neurologic:  Cranial nerves II-XII grossly intact without focal deficits  Psych:  Normal mood and affect

## 2025-02-10 DIAGNOSIS — I10 ESSENTIAL HYPERTENSION: ICD-10-CM

## 2025-02-11 RX ORDER — METOPROLOL TARTRATE 50 MG
50 TABLET ORAL 2 TIMES DAILY
Qty: 180 TABLET | Refills: 0 | Status: SHIPPED | OUTPATIENT
Start: 2025-02-11

## 2025-02-13 DIAGNOSIS — I48.20 CHRONIC ATRIAL FIBRILLATION (HCC): ICD-10-CM

## 2025-02-13 DIAGNOSIS — Z79.01 CHRONIC ANTICOAGULATION: ICD-10-CM

## 2025-02-13 RX ORDER — APIXABAN 2.5 MG/1
2.5 TABLET, FILM COATED ORAL EVERY 12 HOURS
Qty: 60 TABLET | Refills: 0 | Status: SHIPPED | OUTPATIENT
Start: 2025-02-13

## 2025-04-13 DIAGNOSIS — I48.20 CHRONIC ATRIAL FIBRILLATION (HCC): ICD-10-CM

## 2025-04-13 DIAGNOSIS — Z79.01 CHRONIC ANTICOAGULATION: ICD-10-CM

## 2025-04-15 RX ORDER — APIXABAN 2.5 MG/1
2.5 TABLET, FILM COATED ORAL EVERY 12 HOURS
Qty: 60 TABLET | Refills: 11 | Status: SHIPPED | OUTPATIENT
Start: 2025-04-15 | End: 2025-04-23 | Stop reason: SDUPTHER

## 2025-05-12 DIAGNOSIS — I10 ESSENTIAL HYPERTENSION: ICD-10-CM

## 2025-05-12 RX ORDER — METOPROLOL TARTRATE 50 MG
50 TABLET ORAL 2 TIMES DAILY
Qty: 180 TABLET | Refills: 3 | Status: SHIPPED | OUTPATIENT
Start: 2025-05-12

## 2025-06-27 ENCOUNTER — OFFICE VISIT (OUTPATIENT)
Dept: CARDIOLOGY CLINIC | Facility: CLINIC | Age: 85
End: 2025-06-27
Payer: COMMERCIAL

## 2025-06-27 VITALS
HEART RATE: 88 BPM | SYSTOLIC BLOOD PRESSURE: 120 MMHG | BODY MASS INDEX: 28.33 KG/M2 | WEIGHT: 186.9 LBS | DIASTOLIC BLOOD PRESSURE: 84 MMHG | OXYGEN SATURATION: 99 % | HEIGHT: 68 IN

## 2025-06-27 DIAGNOSIS — R04.0 EPISTAXIS, RECURRENT: ICD-10-CM

## 2025-06-27 DIAGNOSIS — I10 PRIMARY HYPERTENSION: ICD-10-CM

## 2025-06-27 DIAGNOSIS — I48.20 CHRONIC ATRIAL FIBRILLATION (HCC): Primary | ICD-10-CM

## 2025-06-27 DIAGNOSIS — I10 ESSENTIAL HYPERTENSION: ICD-10-CM

## 2025-06-27 LAB
QRS AXIS: -10 DEGREES
QRSD INTERVAL: 86 MS
QT INTERVAL: 370 MS
QTC INTERVAL: 447 MS
T WAVE AXIS: -28 DEGREES
VENTRICULAR RATE: 88 BPM

## 2025-06-27 PROCEDURE — 93000 ELECTROCARDIOGRAM COMPLETE: CPT | Performed by: INTERNAL MEDICINE

## 2025-06-27 PROCEDURE — 99214 OFFICE O/P EST MOD 30 MIN: CPT | Performed by: INTERNAL MEDICINE

## 2025-06-27 NOTE — PROGRESS NOTES
Cardiology Follow Up    Edin Davidson  1940  [id number]  [facility]  [address]  [place] [address]    1. Chronic atrial fibrillation (HCC)  POCT ECG    CBC and differential    Comprehensive metabolic panel    Lipid Panel with Direct LDL reflex    Echo complete w/ contrast if indicated      2. Essential hypertension  POCT ECG    CBC and differential    Comprehensive metabolic panel    Lipid Panel with Direct LDL reflex    Echo complete w/ contrast if indicated      3. Epistaxis, recurrent  CBC and differential    Comprehensive metabolic panel    Lipid Panel with Direct LDL reflex    Echo complete w/ contrast if indicated      4. Primary hypertension  CBC and differential    Comprehensive metabolic panel    Lipid Panel with Direct LDL reflex    Echo complete w/ contrast if indicated          Discussion/Summary: Overall he has been doing well from a cardiac standpoint and offers no symptoms.  Rate controlled atrial fibrillation on half dose Eliquis due to prior severe epistaxis.  We talked about watchman in the past he was not interested in pursuing this.  He has not had blood work in several years I ordered some last time but he never got it done we will give him another prescription for labs.  He is due for an echocardiogram to make sure LV function has been preserved.  Blood pressure well-controlled lipids will be checked.  Follow-up in 12 months       Interval History:  84-year-old gentleman with chronic atrial fibrillation on a rate control strategy.  Presents for a preoperative cardiovascular risk assessment.  It has been about 2 years since he came to the office.  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  He remains quite physically active.      He had a fall this winter secondary to slipping on ice.  No syncope.  Denies any chest pain, shortness of  breath, palpitations, lightheadedness, dizziness, or syncope.  There has been no lower extremity edema, PND, orthopnea.  No claudication.  He is sleeping well.  Tries to eat a balanced diet.  No further epistaxis after going half dose on Eliquis.    Problem List       Carpal tunnel syndrome of right wrist    Atrial fibrillation (HCC)          Past Medical History:   Diagnosis Date    A-fib (HCC)     Colon polyp     Diverticulitis     Diverticulitis     Hypertension      Social History     Socioeconomic History    Marital status: /Civil Union     Spouse name: Not on file    Number of children: Not on file    Years of education: Not on file    Highest education level: Not on file   Occupational History    Not on file   Tobacco Use    Smoking status: Never    Smokeless tobacco: Never   Vaping Use    Vaping status: Never Used   Substance and Sexual Activity    Alcohol use: Yes     Alcohol/week: 2.0 standard drinks of alcohol     Types: 2 Cans of beer per week    Drug use: No    Sexual activity: Not on file   Other Topics Concern    Not on file   Social History Narrative    Not on file     Social Drivers of Health     Financial Resource Strain: Not on file   Food Insecurity: Not on file   Transportation Needs: Not on file   Physical Activity: Not on file   Stress: Not on file   Social Connections: Not on file   Intimate Partner Violence: Not on file   Housing Stability: Not on file      Family History   Problem Relation Name Age of Onset    Hypertension Mother      Hypertension Father      Cancer Father      Heart attack Neg Hx      Stroke Neg Hx      Anuerysm Neg Hx      Clotting disorder Neg Hx      Arrhythmia Neg Hx      Heart failure Neg Hx      Coronary artery disease Neg Hx      Sudden death Neg Hx          scd    Hyperlipidemia Neg Hx       Past Surgical History:   Procedure Laterality Date    COLON SURGERY  1990    12 inches removed    HERNIA REPAIR      inguinal    HERNIA REPAIR      abdominal    JOINT  "REPLACEMENT Left 05/2021    knee    JOINT REPLACEMENT Right 2014    knee    [place] REVISE MEDIAN N/CARPAL TUNNEL SURG Right 10/3/2017    Procedure: CARPAL TUNNEL RELEASE;  Surgeon: Rudi Mares DO;  [facility];  Service: Orthopedics    [place] XCAPSL CTRC RMVL INSJ IO LENS PROSTH W/O ECP Left 12/20/2021    Procedure: EXTRACTION EXTRACAPSULAR CATARACT PHACO INTRAOCULAR LENS (IOL);  Surgeon: Rudi Johnson MD;  Location: [facility] MAIN OR;  Service: Ophthalmology    REPLACEMENT TOTAL KNEE         Current Outpatient Medications:     apixaban (Eliquis) 2.5 mg, Take 1 tablet (2.5 mg total) by mouth every 12 (twelve) hours, Disp: 60 tablet, Rfl: 11    metoprolol tartrate (LOPRESSOR) 50 mg tablet, TAKE 1 TABLET TWICE A DAY, Disp: 180 tablet, Rfl: 3    gatifloxacin (ZYMAXID) 0.5 %, Administer 1 drop into the left eye 2 (two) times a day (Patient not taking: Reported on 7/20/2022), Disp: 3 mL, Rfl: 0    ketorolac (ACULAR) 0.5 % ophthalmic solution, Administer 1 drop into the left eye 4 (four) times a day (Patient not taking: No sig reported), Disp: 5 mL, Rfl: 0  No Known Allergies    Labs:     Chemistry        Component Value Date/Time    K 4.6 05/12/2021 0400     05/12/2021 0400    CO2 28 05/12/2021 0400    BUN 17 05/12/2021 0400    CREATININE 0.80 05/12/2021 0400        Component Value Date/Time    CALCIUM 8.9 05/12/2021 0400    ALKPHOS 65 05/04/2021 0812    AST 20 05/04/2021 0812    ALT 18 05/04/2021 0812            No results found for: \"CHOL\"  Lab Results   Component Value Date    HDL 43 08/09/2017     Lab Results   Component Value Date    LDLCALC 74 08/09/2017     Lab Results   Component Value Date    TRIG 107 08/09/2017     No results found for: \"CHOLHDL\"    Imaging: No results found.    ECG:  AFib nonspecific ST and T changes    Review of Systems   Constitutional: Negative.   HENT: Negative.     Eyes: Negative.    Cardiovascular: Negative.    Respiratory: Negative.     Endocrine: Negative.  " "  Hematologic/Lymphatic: Negative.    Skin: Negative.    Musculoskeletal: Negative.    Gastrointestinal: Negative.    Genitourinary: Negative.    Neurological: Negative.    Psychiatric/Behavioral: Negative.     All other systems reviewed and are negative.      Vitals:    06/27/25 0854   BP: 120/84   Pulse: 88   SpO2: 99%     Vitals:    06/27/25 0854   Weight: 84.8 kg (186 lb 14.4 oz)     Height: 5' 8\" (172.7 cm)   Body mass index is 28.42 kg/m².    Physical Exam:  Vital signs reviewed  General:  Alert and cooperative, appears stated age, no acute distress  HEENT:  PERRLA, EOMI, no scleral icterus, no conjunctival pallor  Neck:  No lymphadenopathy, no thyromegaly, no carotid bruits, no elevated JVP  Heart:  irregular rate and rhythm, normal S1/S2, no S3/S4, no murmur, rubs or gallops.  PMI nondisplaced  Lungs:  Clear to auscultation bilaterally, no wheezes rales or rhonchi  Abdomen:  Soft, non-tender, positive bowel sounds, no rebound or guarding,   no organomegaly   Extremities:  Normal range of motion.  No clubbing, cyanosis or edema   Vascular:  2+ pedal pulses  Skin:  No rashes or lesions on exposed skin  Neurologic:  Cranial nerves II-XII grossly intact without focal deficits  Psych:  Normal mood and affect          "

## 2025-08-08 ENCOUNTER — HOSPITAL ENCOUNTER (OUTPATIENT)
Dept: NON INVASIVE DIAGNOSTICS | Facility: HOSPITAL | Age: 85
Discharge: HOME | End: 2025-08-08
Attending: INTERNAL MEDICINE
Payer: COMMERCIAL

## 2025-08-08 VITALS
DIASTOLIC BLOOD PRESSURE: 84 MMHG | WEIGHT: 186 LBS | HEART RATE: 88 BPM | SYSTOLIC BLOOD PRESSURE: 120 MMHG | BODY MASS INDEX: 28.19 KG/M2 | HEIGHT: 68 IN

## 2025-08-08 DIAGNOSIS — I10 ESSENTIAL HYPERTENSION: ICD-10-CM

## 2025-08-08 DIAGNOSIS — I48.20 CHRONIC ATRIAL FIBRILLATION (HCC): ICD-10-CM

## 2025-08-08 DIAGNOSIS — R04.0 EPISTAXIS, RECURRENT: ICD-10-CM

## 2025-08-08 DIAGNOSIS — I10 PRIMARY HYPERTENSION: ICD-10-CM

## 2025-08-08 LAB
AORTIC ROOT: 3.3 CM
ASCENDING AORTA: 3.5 CM
BSA FOR ECHO PROCEDURE: 1.98 M2
FRACTIONAL SHORTENING: 28 (ref 28–44)
INTERVENTRICULAR SEPTUM IN DIASTOLE (PARASTERNAL SHORT AXIS VIEW): 1.4 CM
INTERVENTRICULAR SEPTUM: 1.4 CM (ref 0.6–1.1)
LAAS-AP2: 23.5 CM2
LAAS-AP4: 26.8 CM2
LEFT ATRIUM SIZE: 5.3 CM
LEFT ATRIUM VOLUME (MOD BIPLANE): 84 ML
LEFT ATRIUM VOLUME INDEX (MOD BIPLANE): 42.2 ML/M2
LEFT INTERNAL DIMENSION IN SYSTOLE: 3.3 CM (ref 2.1–4)
LEFT VENTRICULAR INTERNAL DIMENSION IN DIASTOLE: 4.6 CM (ref 3.5–6)
LEFT VENTRICULAR POSTERIOR WALL IN END DIASTOLE: 1.3 CM
LEFT VENTRICULAR STROKE VOLUME: 55 ML
LV EF US.2D.A4C+ESTIMATED: 59 %
LVSV (TEICH): 55 ML
RA PRESSURE ESTIMATED: 10 MMHG
RIGHT ATRIUM AREA SYSTOLE A4C: 21 CM2
RIGHT VENTRICLE ID DIMENSION: 3.6 CM
RV PSP: 29 MMHG
SL CV LEFT ATRIUM LENGTH A2C: 6 CM
SL CV LV EF: 60
SL CV PED ECHO LEFT VENTRICLE DIASTOLIC VOLUME (MOD BIPLANE) 2D: 99 ML
SL CV PED ECHO LEFT VENTRICLE SYSTOLIC VOLUME (MOD BIPLANE) 2D: 44 ML
TR MAX PG: 19 MMHG
TR PEAK VELOCITY: 2.2 M/S
TRICUSPID ANNULAR PLANE SYSTOLIC EXCURSION: 2 CM
TRICUSPID VALVE PEAK REGURGITATION VELOCITY: 2.16 M/S

## 2025-08-08 PROCEDURE — 93306 TTE W/DOPPLER COMPLETE: CPT | Performed by: INTERNAL MEDICINE

## 2025-08-08 PROCEDURE — 93306 TTE W/DOPPLER COMPLETE: CPT

## (undated) DEVICE — STERILE ALLENTOWN HAND PACK: Brand: CARDINAL HEALTH

## (undated) DEVICE — 3M™ TEGADERM™ TRANSPARENT FILM DRESSING FRAME STYLE, 1626W, 4 IN X 4-3/4 IN (10 CM X 12 CM), 50/CT 4CT/CASE: Brand: 3M™ TEGADERM™

## (undated) DEVICE — GLOVE SRG BIOGEL 7.5

## (undated) DEVICE — GLOVE INDICATOR PI UNDERGLOVE SZ 8 BLUE

## (undated) DEVICE — OCCLUSIVE GAUZE STRIP,3% BISMUTH TRIBROMOPHENATE IN PETROLATUM BLEND: Brand: XEROFORM

## (undated) DEVICE — GLOVE SRG BIOGEL ECLIPSE 8

## (undated) DEVICE — CUFF TOURNIQUET 18 X 4 IN QUICK CONNECT DISP 1 BLADDER

## (undated) DEVICE — LIGHT HANDLE COVER SLEEVE DISP BLUE STELLAR

## (undated) DEVICE — INTENDED FOR TISSUE SEPARATION, AND OTHER PROCEDURES THAT REQUIRE A SHARP SURGICAL BLADE TO PUNCTURE OR CUT.: Brand: BARD-PARKER SAFETY BLADES SIZE 15, STERILE

## (undated) DEVICE — ALUMI-HAND POSITIONER XLG

## (undated) DEVICE — SPONGE PVP SCRUB WING STERILE

## (undated) DEVICE — CHLORAPREP HI-LITE 26ML ORANGE

## (undated) DEVICE — ACTIVE FMS W/ INTREPID* ULTRA SLEEVES, 0.9MM 45° ABS* INTREPID* BALANCED TIP: Brand: ALCON

## (undated) DEVICE — CLEARCUT® SLIT KNIFE INTREPID MICRO-COAXIAL SYSTEM 2.4 SB: Brand: CLEARCUT®; INTREPID

## (undated) DEVICE — INTREPID® TRANSFORMER IA HP: Brand: INTREPID®

## (undated) DEVICE — SUT ETHILON 3-0 PS-1 18 IN 1663G

## (undated) DEVICE — GAUZE SPONGES,16 PLY: Brand: CURITY

## (undated) DEVICE — EYE PACK CUSTOM -FINNEGAN

## (undated) DEVICE — MICROSURGICAL INSTRUMENT IRR. CYSTITOME 25GA STRAIGHT-REVERSE CUTTING: Brand: ALCON

## (undated) DEVICE — THE MONARCH® "D" CARTRIDGE IS A SINGLE-USE POLYPROPYLENE CARTRIDGE FOR POSTERIOR CHAMBER IOL DELIVERY: Brand: MONARCH® III

## (undated) DEVICE — AIR INJECT CANNULA 27GA: Brand: OPHTHALMIC CANNULA

## (undated) DEVICE — B-H IRRIGATING CAN 19GA FLAT ANGLED 8MM: Brand: OPHTHALMIC CANNULA